# Patient Record
Sex: FEMALE | Race: BLACK OR AFRICAN AMERICAN | Employment: UNEMPLOYED | URBAN - METROPOLITAN AREA
[De-identification: names, ages, dates, MRNs, and addresses within clinical notes are randomized per-mention and may not be internally consistent; named-entity substitution may affect disease eponyms.]

---

## 2017-05-30 ENCOUNTER — OFFICE VISIT (OUTPATIENT)
Dept: FAMILY MEDICINE CLINIC | Age: 27
End: 2017-05-30

## 2017-05-30 VITALS
HEART RATE: 97 BPM | WEIGHT: 134 LBS | TEMPERATURE: 98.2 F | HEIGHT: 64 IN | SYSTOLIC BLOOD PRESSURE: 147 MMHG | RESPIRATION RATE: 20 BRPM | BODY MASS INDEX: 22.88 KG/M2 | DIASTOLIC BLOOD PRESSURE: 93 MMHG | OXYGEN SATURATION: 98 %

## 2017-05-30 DIAGNOSIS — I10 ESSENTIAL HYPERTENSION: Primary | ICD-10-CM

## 2017-05-30 DIAGNOSIS — F32.A DEPRESSION, UNSPECIFIED DEPRESSION TYPE: ICD-10-CM

## 2017-05-30 RX ORDER — BUPROPION HYDROCHLORIDE 150 MG/1
150 TABLET, EXTENDED RELEASE ORAL 2 TIMES DAILY
Qty: 60 TAB | Refills: 3 | Status: SHIPPED | OUTPATIENT
Start: 2017-05-30 | End: 2017-08-22 | Stop reason: SDUPTHER

## 2017-05-30 RX ORDER — AMLODIPINE BESYLATE 5 MG/1
5 TABLET ORAL DAILY
Qty: 30 TAB | Refills: 1 | Status: SHIPPED | OUTPATIENT
Start: 2017-05-30 | End: 2017-08-22 | Stop reason: SDUPTHER

## 2017-05-30 RX ORDER — DULOXETIN HYDROCHLORIDE 60 MG/1
CAPSULE, DELAYED RELEASE ORAL
Qty: 30 CAP | Refills: 3 | Status: SHIPPED | OUTPATIENT
Start: 2017-05-30 | End: 2017-08-22 | Stop reason: SDUPTHER

## 2017-05-30 NOTE — PROGRESS NOTES
1. Have you been to the ER, urgent care clinic since your last visit? Hospitalized since your last visit? Yes    2. Have you seen or consulted any other health care providers outside of the [facility] since your last visit? Include any pap smears or colon screening.  No

## 2017-05-30 NOTE — PROGRESS NOTES
HISTORY OF PRESENT ILLNESS  Molina Yin is a 24 y.o. female. HPI  Pt was recently in the hospital for 2 days, discharged on 5-19, she was in for DKA, her insulin pump failed, it was fixed later and she is followed by her Endocrinologist    HTN, not controlled, her bp has been elevated, and it was also elevated during her hospital stay, she is on lisinopril daily    Depression, stable on cymbalta and wellbutrin, need refills on meds    Review of Systems   Constitutional: Negative for chills and fever. Respiratory: Negative for cough and shortness of breath. Cardiovascular: Negative for chest pain and leg swelling. Gastrointestinal: Negative for abdominal pain and nausea. Neurological: Positive for headaches. Negative for dizziness. Psychiatric/Behavioral: The patient does not have insomnia. Physical Exam   Constitutional: She is oriented to person, place, and time. She appears well-developed and well-nourished. Neck: Neck supple. No thyromegaly present. Cardiovascular: Normal rate, regular rhythm and normal heart sounds. No murmur heard. Pulmonary/Chest: Effort normal and breath sounds normal. She has no wheezes. She has no rales. Abdominal: Soft. Bowel sounds are normal. There is no tenderness. Musculoskeletal: She exhibits no edema. Lymphadenopathy:     She has no cervical adenopathy. Neurological: She is alert and oriented to person, place, and time. Psychiatric: She has a normal mood and affect. Her behavior is normal. Judgment and thought content normal.   Vitals reviewed. ASSESSMENT and PLAN  Flavia Leon was seen today for hypertension. Diagnoses and all orders for this visit:    Essential hypertension, uncontrolled, add Norvasc  -     amLODIPine (NORVASC) 5 mg tablet; Take 1 Tab by mouth daily. Depression, unspecified depression type, stable  -     DULoxetine (CYMBALTA) 60 mg capsule;  Take 1 capsule daily  -     buPROPion SR (WELLBUTRIN SR) 150 mg SR tablet; Take 1 Tab by mouth two (2) times a day.     Headache, ? 2/2 elevated bp, she may take Tylenol prn for now, monitor bp    rtc 1 mo

## 2017-06-15 DIAGNOSIS — F32.A DEPRESSION, UNSPECIFIED DEPRESSION TYPE: ICD-10-CM

## 2017-06-16 RX ORDER — DULOXETIN HYDROCHLORIDE 60 MG/1
CAPSULE, DELAYED RELEASE ORAL
Qty: 30 CAP | Refills: 0 | Status: SHIPPED | OUTPATIENT
Start: 2017-06-16 | End: 2018-03-02 | Stop reason: SDUPTHER

## 2017-08-22 ENCOUNTER — OFFICE VISIT (OUTPATIENT)
Dept: FAMILY MEDICINE CLINIC | Age: 27
End: 2017-08-22

## 2017-08-22 VITALS
WEIGHT: 139 LBS | BODY MASS INDEX: 23.73 KG/M2 | HEIGHT: 64 IN | HEART RATE: 100 BPM | DIASTOLIC BLOOD PRESSURE: 80 MMHG | SYSTOLIC BLOOD PRESSURE: 129 MMHG | TEMPERATURE: 98.4 F | OXYGEN SATURATION: 100 % | RESPIRATION RATE: 20 BRPM

## 2017-08-22 DIAGNOSIS — I10 ESSENTIAL HYPERTENSION: ICD-10-CM

## 2017-08-22 DIAGNOSIS — F32.A DEPRESSION, UNSPECIFIED DEPRESSION TYPE: ICD-10-CM

## 2017-08-22 RX ORDER — AMLODIPINE BESYLATE 5 MG/1
5 TABLET ORAL DAILY
Qty: 30 TAB | Refills: 3 | Status: SHIPPED | OUTPATIENT
Start: 2017-08-22 | End: 2018-03-02 | Stop reason: SDUPTHER

## 2017-08-22 RX ORDER — DULOXETIN HYDROCHLORIDE 60 MG/1
CAPSULE, DELAYED RELEASE ORAL
Qty: 30 CAP | Refills: 3 | Status: SHIPPED | OUTPATIENT
Start: 2017-08-22 | End: 2018-02-23 | Stop reason: SDUPTHER

## 2017-08-22 RX ORDER — BUPROPION HYDROCHLORIDE 150 MG/1
150 TABLET, EXTENDED RELEASE ORAL 2 TIMES DAILY
Qty: 60 TAB | Refills: 3 | Status: SHIPPED | OUTPATIENT
Start: 2017-08-22 | End: 2018-03-02 | Stop reason: SDUPTHER

## 2017-08-22 RX ORDER — LISINOPRIL 20 MG/1
20 TABLET ORAL DAILY
Qty: 30 TAB | Refills: 3 | Status: SHIPPED | OUTPATIENT
Start: 2017-08-22 | End: 2018-03-02 | Stop reason: SDUPTHER

## 2017-08-22 NOTE — PROGRESS NOTES
Tiarra Arnold is a 24 y.o. female  1. Have you been to the ER, urgent care clinic since your last visit? Hospitalized since your last visit? No    2. Have you seen or consulted any other health care providers outside of the [address] [place] [address] since your last visit? Include any pap smears or colon screening.  No

## 2017-08-22 NOTE — PROGRESS NOTES
HISTORY OF PRESENT ILLNESS  Dinesh Dodson is a 24 y.o. female. HPI  HTN, improved, bp is much better since last visit, on meds daily  Depression, not well controlled, she still feels down at times, she is taking her meds daily  Review of Systems   Respiratory: Negative for shortness of breath. Cardiovascular: Negative for chest pain and palpitations. Psychiatric/Behavioral: Negative for substance abuse. The patient does not have insomnia. Physical Exam   Constitutional: She appears well-developed and well-nourished. Cardiovascular: Normal rate, regular rhythm and normal heart sounds. Pulmonary/Chest: Effort normal and breath sounds normal.   Psychiatric: She has a normal mood and affect. Her behavior is normal.   Vitals reviewed. ASSESSMENT and PLAN  Diagnoses and all orders for this visit:    1. Essential hypertension, stable, continue meds  -     amLODIPine (NORVASC) 5 mg tablet; Take 1 Tab by mouth daily. -     lisinopril (PRINIVIL, ZESTRIL) 20 mg tablet; Take 1 Tab by mouth daily. 2. Depression, unspecified depression type, continue meds, refer to Psych  -     buPROPion SR (WELLBUTRIN SR) 150 mg SR tablet; Take 1 Tab by mouth two (2) times a day. -     DULoxetine (CYMBALTA) 60 mg capsule;  Take 1 capsule daily  -     REFERRAL TO PSYCHIATRY    rtc 4 mos

## 2018-02-23 ENCOUNTER — OFFICE VISIT (OUTPATIENT)
Dept: FAMILY MEDICINE CLINIC | Age: 28
End: 2018-02-23

## 2018-02-23 VITALS
HEART RATE: 104 BPM | OXYGEN SATURATION: 100 % | HEIGHT: 64 IN | WEIGHT: 130 LBS | DIASTOLIC BLOOD PRESSURE: 85 MMHG | SYSTOLIC BLOOD PRESSURE: 130 MMHG | TEMPERATURE: 96.9 F | BODY MASS INDEX: 22.2 KG/M2 | RESPIRATION RATE: 20 BRPM

## 2018-02-23 DIAGNOSIS — M54.50 ACUTE LEFT-SIDED LOW BACK PAIN WITHOUT SCIATICA: Primary | ICD-10-CM

## 2018-02-23 RX ORDER — CYCLOBENZAPRINE HCL 5 MG
5 TABLET ORAL
Qty: 30 TAB | Refills: 0 | Status: SHIPPED | OUTPATIENT
Start: 2018-02-23 | End: 2018-03-02

## 2018-02-23 RX ORDER — KETOROLAC TROMETHAMINE 30 MG/ML
30 INJECTION, SOLUTION INTRAMUSCULAR; INTRAVENOUS ONCE
Qty: 1 VIAL | Refills: 0
Start: 2018-02-23 | End: 2018-02-23

## 2018-02-23 RX ORDER — DICLOFENAC SODIUM 75 MG/1
75 TABLET, DELAYED RELEASE ORAL 2 TIMES DAILY
COMMUNITY
Start: 2018-02-19 | End: 2018-02-23 | Stop reason: SDUPTHER

## 2018-02-23 RX ORDER — DICLOFENAC SODIUM 75 MG/1
75 TABLET, DELAYED RELEASE ORAL 2 TIMES DAILY
Qty: 20 TAB | Refills: 0 | Status: SHIPPED | OUTPATIENT
Start: 2018-02-23 | End: 2018-03-02

## 2018-02-23 RX ORDER — CYCLOBENZAPRINE HCL 10 MG
10 TABLET ORAL 2 TIMES DAILY
COMMUNITY
Start: 2018-02-19 | End: 2018-02-23 | Stop reason: DRUGHIGH

## 2018-02-23 NOTE — PROGRESS NOTES
HISTORY OF PRESENT ILLNESS  Dameon Clarke is a 34 y.o. female. HPI  C/o left side low back pain, started 5 days ago suddenly after she \"twisted my back\", no radiation to the legs, no numbness or weakness, went to the ER, given Voltaren and flexeril but she took them few times only, her pain is slightly better, she was not given any exercises to do at home  Review of Systems   Constitutional: Negative for chills and fever. Gastrointestinal: Negative for abdominal pain and nausea. Genitourinary: Negative for dysuria and hematuria. Physical Exam   Cardiovascular: Normal rate, regular rhythm and normal heart sounds. Pulmonary/Chest: Effort normal and breath sounds normal.   Musculoskeletal:        Lumbar back: She exhibits decreased range of motion, tenderness (left lumbar), pain and spasm. She exhibits no bony tenderness, no edema and no deformity. Neurological: She has normal strength. No sensory deficit. Skin: No rash noted. Vitals reviewed. ASSESSMENT and PLAN  Diagnoses and all orders for this visit:    1. Acute left-sided low back pain without sciatica  -     ketorolac (TORADOL) 30 mg/mL (1 mL) injection; 1 mL by IntraVENous route once for 1 dose. -     KETOROLAC TROMETHAMINE INJ  -     MA THER/PROPH/DIAG INJECTION, SUBCUT/IM  -     cyclobenzaprine (FLEXERIL) 5 mg tablet; Take 1 Tab by mouth three (3) times daily as needed for Muscle Spasm(s). -     diclofenac EC (VOLTAREN) 75 mg EC tablet; Take 1 Tab by mouth two (2) times a day.     Home exercises given  Off work for 2 days, light duties with no heavy lifting for a week

## 2018-02-23 NOTE — LETTER
NOTIFICATION RETURN TO WORK  
 
2/23/2018 Ms. Jericho Goldberg [address] [place] [address] To Whom It May Concern: 
 
Jericho Goldberg is currently under the care of [address] [place] [address]. She will return to work on 2-, but she should not do any lifting more than 20 lbs for a week, may go back to full duties on 3-3-18. If there are questions or concerns please have the patient contact our office. Sincerely, Ahsan Verma MD

## 2018-02-23 NOTE — PROGRESS NOTES
Chata Herrera is a 34 y.o. female (: 1990) presenting to address:    Chief Complaint   Patient presents with    Back Pain       Vitals:    18 0811   BP: 130/85   Pulse: (!) 104   Resp: 20   Temp: 96.9 °F (36.1 °C)   TempSrc: Oral   SpO2: 100%   Weight: 130 lb (59 kg)   Height: 5' 4\" (1.626 m)   PainSc:   8   PainLoc: Back   LMP: 2018       Learning Assessment:     Learning Assessment 3/16/2016   PRIMARY LEARNER Patient   HIGHEST LEVEL OF EDUCATION - PRIMARY LEARNER  SOME COLLEGE   BARRIERS PRIMARY LEARNER NONE   CO-LEARNER CAREGIVER No   PRIMARY LANGUAGE ENGLISH   LEARNER PREFERENCE PRIMARY OTHER (COMMENT)   ANSWERED BY patient   RELATIONSHIP SELF     Depression Screening:     PHQ over the last two weeks 2017   Little interest or pleasure in doing things Not at all   Feeling down, depressed or hopeless Not at all   Total Score PHQ 2 0   Trouble falling or staying asleep, or sleeping too much -   Feeling tired or having little energy -   Poor appetite or overeating -   Feeling bad about yourself - or that you are a failure or have let yourself or your family down -   Trouble concentrating on things such as school, work, reading or watching TV -   Moving or speaking so slowly that other people could have noticed; or the opposite being so fidgety that others notice -   Thoughts of being better off dead, or hurting yourself in some way -   PHQ 9 Score -   How difficult have these problems made it for you to do your work, take care of your home and get along with others -     Fall Risk Assessment:     Fall Risk Assessment, last 12 mths 2018   Able to walk? Yes   Fall in past 12 months? No     Abuse Screening:     Abuse Screening Questionnaire 2018   Do you ever feel afraid of your partner? N   Are you in a relationship with someone who physically or mentally threatens you? N   Is it safe for you to go home? Y     Coordination of Care Questionaire:   1.  Have you been to the ER, urgent care clinic since your last visit? Hospitalized since your last visit? YES [facility]- ED 2/19/18    2. Have you seen or consulted any other health care providers outside of the [address] [place] [address] since your last visit? Include any pap smears or colon screening. NO    Advanced Directive:   1. Do you have an Advanced Directive? YES    2. Would you like information on Advanced Directives?  NO

## 2018-02-23 NOTE — PATIENT INSTRUCTIONS
Learning About How to Have a Healthy Back  What causes back pain? Back pain is often caused by overuse, strain, or injury. For example, people often hurt their backs playing sports or working in the yard, being jolted in a car accident, or lifting something too heavy. Aging plays a part too. Your bones and muscles tend to lose strength as you age, which makes injury more likely. The spongy discs between the bones of the spine (vertebrae) may suffer from wear and tear and no longer provide enough cushion between the bones. A disc that bulges or breaks open (herniated disc) can press on nerves, causing back pain. In some people, back pain is the result of arthritis, broken vertebrae caused by bone loss (osteoporosis), illness, or a spine problem. Although most people have back pain at one time or another, there are steps you can take to make it less likely. How can you have a healthy back? Reduce stress on your back through good posture  Slumping or slouching alone may not cause low back pain. But after the back has been strained or injured, bad posture can make pain worse. · Sleep in a position that maintains your back's normal curves and on a mattress that feels comfortable. Sleep on your side with a pillow between your knees, or sleep on your back with a pillow under your knees. These positions can reduce strain on your back. · Stand and sit up straight. \"Good posture\" generally means your ears, shoulders, and hips are in a straight line. · If you must stand for a long time, put one foot on a stool, ledge, or box. Switch feet every now and then. · Sit in a chair that is low enough to let you place both feet flat on the floor with both knees nearly level with your hips. If your chair or desk is too high, use a footrest to raise your knees. Place a small pillow, a rolled-up towel, or a lumbar roll in the curve of your back if you need extra support.   · Try a kneeling chair, which helps tilt your hips forward. This takes pressure off your lower back. · Try sitting on an exercise ball. It can rock from side to side, which helps keep your back loose. · When driving, keep your knees nearly level with your hips. Sit straight, and drive with both hands on the steering wheel. Your arms should be in a slightly bent position. Reduce stress on your back through careful lifting  · Squat down, bending at the hips and knees only. If you need to, put one knee to the floor and extend your other knee in front of you, bent at a right angle (half kneeling). · Press your chest straight forward. This helps keep your upper back straight while keeping a slight arch in your low back. · Hold the load as close to your body as possible, at the level of your belly button (navel). · Use your feet to change direction, taking small steps. · Lead with your hips as you change direction. Keep your shoulders in line with your hips as you move. · Set down your load carefully, squatting with your knees and hips only. Exercise and stretch your back  · Do some exercise on most days of the week, if your doctor says it is okay. You can walk, run, swim, or cycle. · Stretch your back muscles. Here are a few exercises to try:  Michelle Corti on your back, and gently pull one bent knee to your chest. Put that foot back on the floor, and then pull the other knee to your chest.  ¨ Do pelvic tilts. Lie on your back with your knees bent. Tighten your stomach muscles. Pull your belly button (navel) in and up toward your ribs. You should feel like your back is pressing to the floor and your hips and pelvis are slightly lifting off the floor. Hold for 6 seconds while breathing smoothly. ¨ Sit with your back flat against a wall. · Keep your core muscles strong. The muscles of your back, belly (abdomen), and buttocks support your spine. ¨ Pull in your belly and imagine pulling your navel toward your spine. Hold this for 6 seconds, then relax.  Remember to keep breathing normally as you tense your muscles. ¨ Do curl-ups. Always do them with your knees bent. Keep your low back on the floor, and curl your shoulders toward your knees using a smooth, slow motion. Keep your arms folded across your chest. If this bothers your neck, try putting your hands behind your neck (not your head), with your elbows spread apart. ¨ Lie on your back with your knees bent and your feet flat on the floor. Tighten your belly muscles, and then push with your feet and raise your buttocks up a few inches. Hold this position 6 seconds as you continue to breathe normally, then lower yourself slowly to the floor. Repeat 8 to 12 times. ¨ If you like group exercise, try Pilates or yoga. These classes have poses that strengthen the core muscles. Lead a healthy lifestyle  · Stay at a healthy weight to avoid strain on your back. · Do not smoke. Smoking increases the risk of osteoporosis, which weakens the spine. If you need help quitting, talk to your doctor about stop-smoking programs and medicines. These can increase your chances of quitting for good. Where can you learn more? Go to http://gailmetraTecmichelle.info/. Enter L315 in the search box to learn more about \"Learning About How to Have a Healthy Back. \"  Current as of: March 21, 2017  Content Version: 11.4  © 8033-2579 Healthwise, Incorporated. Care instructions adapted under license by [employer] (which disclaims liability or warranty for this information). If you have questions about a medical condition or this instruction, always ask your healthcare professional. [place] [address] any warranty or liability for your use of this information. Back Pain: Care Instructions  Your Care Instructions    Back pain has many possible causes. It is often related to problems with muscles and ligaments of the back. It may also be related to problems with the nerves, discs, or bones of the back.  Moving, lifting, standing, sitting, or sleeping in an awkward way can strain the back. Sometimes you don't notice the injury until later. Arthritis is another common cause of back pain. Although it may hurt a lot, back pain usually improves on its own within several weeks. Most people recover in 12 weeks or less. Using good home treatment and being careful not to stress your back can help you feel better sooner. Follow-up care is a key part of your treatment and safety. Be sure to make and go to all appointments, and call your doctor if you are having problems. It's also a good idea to know your test results and keep a list of the medicines you take. How can you care for yourself at home? · Sit or lie in positions that are most comfortable and reduce your pain. Try one of these positions when you lie down:  ¨ Lie on your back with your knees bent and supported by large pillows. ¨ Lie on the floor with your legs on the seat of a sofa or chair. Grant Valeri on your side with your knees and hips bent and a pillow between your legs. ¨ Lie on your stomach if it does not make pain worse. · Do not sit up in bed, and avoid soft couches and twisted positions. Bed rest can help relieve pain at first, but it delays healing. Avoid bed rest after the first day of back pain. · Change positions every 30 minutes. If you must sit for long periods of time, take breaks from sitting. Get up and walk around, or lie in a comfortable position. · Try using a heating pad on a low or medium setting for 15 to 20 minutes every 2 or 3 hours. Try a warm shower in place of one session with the heating pad. · You can also try an ice pack for 10 to 15 minutes every 2 to 3 hours. Put a thin cloth between the ice pack and your skin. · Take pain medicines exactly as directed. ¨ If the doctor gave you a prescription medicine for pain, take it as prescribed.   ¨ If you are not taking a prescription pain medicine, ask your doctor if you can take an over-the-counter medicine. · Take short walks several times a day. You can start with 5 to 10 minutes, 3 or 4 times a day, and work up to longer walks. Walk on level surfaces and avoid hills and stairs until your back is better. · Return to work and other activities as soon as you can. Continued rest without activity is usually not good for your back. · To prevent future back pain, do exercises to stretch and strengthen your back and stomach. Learn how to use good posture, safe lifting techniques, and proper body mechanics. When should you call for help? Call your doctor now or seek immediate medical care if:  ? · You have new or worsening numbness in your legs. ? · You have new or worsening weakness in your legs. (This could make it hard to stand up.)   ? · You lose control of your bladder or bowels. ? Watch closely for changes in your health, and be sure to contact your doctor if:  ? · Your pain gets worse. ? · You are not getting better after 2 weeks. Where can you learn more? Go to http://gail-michelle.info/. Enter G614 in the search box to learn more about \"Back Pain: Care Instructions. \"  Current as of: March 21, 2017  Content Version: 11.4  © 4999-6600 [employer]. Care instructions adapted under license by [employer] (which disclaims liability or warranty for this information). If you have questions about a medical condition or this instruction, always ask your healthcare professional. [place] [address] any warranty or liability for your use of this information. Low Back Pain: Exercises  Your Care Instructions  Here are some examples of typical rehabilitation exercises for your condition. Start each exercise slowly. Ease off the exercise if you start to have pain. Your doctor or physical therapist will tell you when you can start these exercises and which ones will work best for you. How to do the exercises  Press-up    1.  [employer] on your stomach, supporting your body with your forearms. 2. Press your elbows down into the floor to raise your upper back. As you do this, relax your stomach muscles and allow your back to arch without using your back muscles. As your press up, do not let your hips or pelvis come off the floor. 3. Hold for 15 to 30 seconds, then relax. 4. Repeat 2 to 4 times. Alternate arm and leg (bird dog) exercise    Do this exercise slowly. Try to keep your body straight at all times, and do not let one hip drop lower than the other. 1. Start on the floor, on your hands and knees. 2. Tighten your belly muscles. 3. Raise one leg off the floor, and hold it straight out behind you. Be careful not to let your hip drop down, because that will twist your trunk. 4. Hold for about 6 seconds, then lower your leg and switch to the other leg. 5. Repeat 8 to 12 times on each leg. 6. Over time, work up to holding for 10 to 30 seconds each time. 7. If you feel stable and secure with your leg raised, try raising the opposite arm straight out in front of you at the same time. Knee-to-chest exercise    1. Lie on your back with your knees bent and your feet flat on the floor. 2. Bring one knee to your chest, keeping the other foot flat on the floor (or keeping the other leg straight, whichever feels better on your lower back). 3. Keep your lower back pressed to the floor. Hold for at least 15 to 30 seconds. 4. Relax, and lower the knee to the starting position. 5. Repeat with the other leg. Repeat 2 to 4 times with each leg. 6. To get more stretch, put your other leg flat on the floor while pulling your knee to your chest.  Curl-ups    1. Lie on the floor on your back with your knees bent at a 90-degree angle. Your feet should be flat on the floor, about 12 inches from your buttocks.   2. Cross your arms over your chest. If this bothers your neck, try putting your hands behind your neck (not your head), with your elbows spread apart.  3. Slowly tighten your belly muscles and raise your shoulder blades off the floor. 4. Keep your head in line with your body, and do not press your chin to your chest.  5. Hold this position for 1 or 2 seconds, then slowly lower yourself back down to the floor. 6. Repeat 8 to 12 times. Pelvic tilt exercise    1. Lie on your back with your knees bent. 2. \"Brace\" your stomach. This means to tighten your muscles by pulling in and imagining your belly button moving toward your spine. You should feel like your back is pressing to the floor and your hips and pelvis are rocking back. 3. Hold for about 6 seconds while you breathe smoothly. 4. Repeat 8 to 12 times. Heel dig bridging    1. Lie on your back with both knees bent and your ankles bent so that only your heels are digging into the floor. Your knees should be bent about 90 degrees. 2. Then push your heels into the floor, squeeze your buttocks, and lift your hips off the floor until your shoulders, hips, and knees are all in a straight line. 3. Hold for about 6 seconds as you continue to breathe normally, and then slowly lower your hips back down to the floor and rest for up to 10 seconds. 4. Do 8 to 12 repetitions. Hamstring stretch in doorway    1. Lie on your back in a doorway, with one leg through the open door. 2. Slide your leg up the wall to straighten your knee. You should feel a gentle stretch down the back of your leg. 3. Hold the stretch for at least 15 to 30 seconds. Do not arch your back, point your toes, or bend either knee. Keep one heel touching the floor and the other heel touching the wall. 4. Repeat with your other leg. 5. Do 2 to 4 times for each leg. Hip flexor stretch    1. Kneel on the floor with one knee bent and one leg behind you. Place your forward knee over your foot. Keep your other knee touching the floor. 2. Slowly push your hips forward until you feel a stretch in the upper thigh of your rear leg.   3. Hold the stretch for at least 15 to 30 seconds. Repeat with your other leg. 4. Do 2 to 4 times on each side. Wall sit    1. Stand with your back 10 to 12 inches away from a wall. 2. Lean into the wall until your back is flat against it. 3. Slowly slide down until your knees are slightly bent, pressing your lower back into the wall. 4. Hold for about 6 seconds, then slide back up the wall. 5. Repeat 8 to 12 times. Follow-up care is a key part of your treatment and safety. Be sure to make and go to all appointments, and call your doctor if you are having problems. It's also a good idea to know your test results and keep a list of the medicines you take. Where can you learn more? Go to http://gail-michelle.info/. Enter V255 in the search box to learn more about \"Low Back Pain: Exercises. \"  Current as of: March 21, 2017  Content Version: 11.4  © 5249-1869 Healthwise, Incorporated. Care instructions adapted under license by [employer] (which disclaims liability or warranty for this information). If you have questions about a medical condition or this instruction, always ask your healthcare professional. [place] [address] any warranty or liability for your use of this information.

## 2018-02-23 NOTE — MR AVS SNAPSHOT
Georgette Orlando 
 
 
 [address] [address] [facility] [id number] [phone] Patient: Rose Mary Chew MRN: [id number] :1990 Visit Information Date & Time Provider Department Dept. Phone Encounter #  
 2018  8:15 AM Carol Zuniga, [employer] [phone] [id number] Upcoming Health Maintenance Date Due  
 HPV AGE 9Y-34Y (1 of 3 - Female 3 Dose Series) 2003 DTaP/Tdap/Td series (1 - Tdap) 2013 PAP AKA CERVICAL CYTOLOGY 2013 Influenza Age 5 to Adult 2017 Allergies as of 2018  Review Complete On: 2018 By: Carol Zuniga MD  
 No Known Allergies Current Immunizations  Never Reviewed No immunizations on file. Not reviewed this visit You Were Diagnosed With   
  
 Codes Comments Acute left-sided low back pain without sciatica    -  Primary ICD-10-CM: M54.5 ICD-9-CM: 724.2 Vitals BP Pulse Temp Resp Height(growth percentile) Weight(growth percentile) 130/85 (BP 1 Location: Left arm, BP Patient Position: Sitting) (!) 104 96.9 °F (36.1 °C) (Oral) 20 5' 4\" (1.626 m) 130 lb (59 kg) LMP SpO2 BMI OB Status Smoking Status 2018 100% 22.31 kg/m2 Having regular periods Former Smoker Vitals History BMI and BSA Data Body Mass Index Body Surface Area  
 22.31 kg/m 2 1.63 m 2 Preferred Pharmacy Pharmacy Name Phone [facility] [address] [place] [address] [phone] Your Updated Medication List  
  
   
This list is accurate as of 18  8:36 AM.  Always use your most recent med list.  
  
  
  
  
  insulin pump Misc Commonly known as:  PATIENT SUPPLIED  
by SubCUTAneous route as needed. amLODIPine 5 mg tablet Commonly known as:  Simi Peña Take 1 Tab by mouth daily. buPROPion  mg SR tablet Commonly known as:  Nirmal Susan Take 1 Tab by mouth two (2) times a day. cyclobenzaprine 5 mg tablet Commonly known as:  FLEXERIL Take 1 Tab by mouth three (3) times daily as needed for Muscle Spasm(s). diclofenac EC 75 mg EC tablet Commonly known as:  VOLTAREN Take 1 Tab by mouth two (2) times a day. DULoxetine 60 mg capsule Commonly known as:  CYMBALTA TAKE 1 CAPSULE BY MOUTH DAILY  
  
 ketorolac 30 mg/mL (1 mL) injection Commonly known as:  TORADOL  
1 mL by IntraVENous route once for 1 dose. lisinopril 20 mg tablet Commonly known as:  Marlane Bozena Take 1 Tab by mouth daily. Prescriptions Sent to Pharmacy Refills  
 cyclobenzaprine (FLEXERIL) 5 mg tablet 0 Sig: Take 1 Tab by mouth three (3) times daily as needed for Muscle Spasm(s). Class: Normal  
 Pharmacy: [facility] [address] [place] [address], [address] [place] [address] Ph #: [phone] Route: Oral  
 diclofenac EC (VOLTAREN) 75 mg EC tablet 0 Sig: Take 1 Tab by mouth two (2) times a day. Class: Normal  
 Pharmacy: [facility] [address], [address] [place] [address] Ph #: [phone] Route: Oral  
  
We Performed the Following KETOROLAC TROMETHAMINE INJ [ [facility]] NE THER/PROPH/DIAG INJECTION, SUBCUT/IM [id number] CPT(R)] Patient Instructions Learning About How to Have a Healthy Back What causes back pain? Back pain is often caused by overuse, strain, or injury. For example, people often hurt their backs playing sports or working in the yard, being jolted in a car accident, or lifting something too heavy. Aging plays a part too. Your bones and muscles tend to lose strength as you age, which makes injury more likely.  The spongy discs between the bones of the spine (vertebrae) may suffer from wear and tear and no longer provide enough cushion between the bones. A disc that bulges or breaks open (herniated disc) can press on nerves, causing back pain. In some people, back pain is the result of arthritis, broken vertebrae caused by bone loss (osteoporosis), illness, or a spine problem. Although most people have back pain at one time or another, there are steps you can take to make it less likely. How can you have a healthy back? Reduce stress on your back through good posture Slumping or slouching alone may not cause low back pain. But after the back has been strained or injured, bad posture can make pain worse. · Sleep in a position that maintains your back's normal curves and on a mattress that feels comfortable. Sleep on your side with a pillow between your knees, or sleep on your back with a pillow under your knees. These positions can reduce strain on your back. · Stand and sit up straight. \"Good posture\" generally means your ears, shoulders, and hips are in a straight line. · If you must stand for a long time, put one foot on a stool, ledge, or box. Switch feet every now and then. · Sit in a chair that is low enough to let you place both feet flat on the floor with both knees nearly level with your hips. If your chair or desk is too high, use a footrest to raise your knees. Place a small pillow, a rolled-up towel, or a lumbar roll in the curve of your back if you need extra support. · Try a kneeling chair, which helps tilt your hips forward. This takes pressure off your lower back. · Try sitting on an exercise ball. It can rock from side to side, which helps keep your back loose. · When driving, keep your knees nearly level with your hips. Sit straight, and drive with both hands on the steering wheel. Your arms should be in a slightly bent position. Reduce stress on your back through careful lifting · Squat down, bending at the hips and knees only.  If you need to, put one knee to the floor and extend your other knee in front of you, bent at a right angle (half kneeling). · Press your chest straight forward. This helps keep your upper back straight while keeping a slight arch in your low back. · Hold the load as close to your body as possible, at the level of your belly button (navel). · Use your feet to change direction, taking small steps. · Lead with your hips as you change direction. Keep your shoulders in line with your hips as you move. · Set down your load carefully, squatting with your knees and hips only. Exercise and stretch your back · Do some exercise on most days of the week, if your doctor says it is okay. You can walk, run, swim, or cycle. · Stretch your back muscles. Here are a few exercises to try: ¨ Lie on your back, and gently pull one bent knee to your chest. Put that foot back on the floor, and then pull the other knee to your chest. 
¨ Do pelvic tilts. Lie on your back with your knees bent. Tighten your stomach muscles. Pull your belly button (navel) in and up toward your ribs. You should feel like your back is pressing to the floor and your hips and pelvis are slightly lifting off the floor. Hold for 6 seconds while breathing smoothly. ¨ Sit with your back flat against a wall. · Keep your core muscles strong. The muscles of your back, belly (abdomen), and buttocks support your spine. ¨ Pull in your belly and imagine pulling your navel toward your spine. Hold this for 6 seconds, then relax. Remember to keep breathing normally as you tense your muscles. ¨ Do curl-ups. Always do them with your knees bent. Keep your low back on the floor, and curl your shoulders toward your knees using a smooth, slow motion. Keep your arms folded across your chest. If this bothers your neck, try putting your hands behind your neck (not your head), with your elbows spread apart. ¨ Lie on your back with your knees bent and your feet flat on the floor. Tighten your belly muscles, and then push with your feet and raise your buttocks up a few inches. Hold this position 6 seconds as you continue to breathe normally, then lower yourself slowly to the floor. Repeat 8 to 12 times. ¨ If you like group exercise, try Pilates or yoga. These classes have poses that strengthen the core muscles. Lead a healthy lifestyle · Stay at a healthy weight to avoid strain on your back. · Do not smoke. Smoking increases the risk of osteoporosis, which weakens the spine. If you need help quitting, talk to your doctor about stop-smoking programs and medicines. These can increase your chances of quitting for good. Where can you learn more? Go to http://gailStopandWalk.commichelle.info/. Enter L315 in the search box to learn more about \"Learning About How to Have a Healthy Back. \" Current as of: March 21, 2017 Content Version: 11.4 © 7560-1372 [email or web address]. Care instructions adapted under license by [employer] (which disclaims liability or warranty for this information). If you have questions about a medical condition or this instruction, always ask your healthcare professional. [place] [address] any warranty or liability for your use of this information. Back Pain: Care Instructions Your Care Instructions Back pain has many possible causes. It is often related to problems with muscles and ligaments of the back. It may also be related to problems with the nerves, discs, or bones of the back. Moving, lifting, standing, sitting, or sleeping in an awkward way can strain the back. Sometimes you don't notice the injury until later. Arthritis is another common cause of back pain. Although it may hurt a lot, back pain usually improves on its own within several weeks. Most people recover in 12 weeks or less. Using good home treatment and being careful not to stress your back can help you feel better sooner. Follow-up care is a key part of your treatment and safety. Be sure to make and go to all appointments, and call your doctor if you are having problems. It's also a good idea to know your test results and keep a list of the medicines you take. How can you care for yourself at home? · Sit or lie in positions that are most comfortable and reduce your pain. Try one of these positions when you lie down: ¨ Lie on your back with your knees bent and supported by large pillows. ¨ Lie on the floor with your legs on the seat of a sofa or chair. Aung Harries on your side with your knees and hips bent and a pillow between your legs. ¨ Lie on your stomach if it does not make pain worse. · Do not sit up in bed, and avoid soft couches and twisted positions. Bed rest can help relieve pain at first, but it delays healing. Avoid bed rest after the first day of back pain. · Change positions every 30 minutes. If you must sit for long periods of time, take breaks from sitting. Get up and walk around, or lie in a comfortable position. · Try using a heating pad on a low or medium setting for 15 to 20 minutes every 2 or 3 hours. Try a warm shower in place of one session with the heating pad. · You can also try an ice pack for 10 to 15 minutes every 2 to 3 hours. Put a thin cloth between the ice pack and your skin. · Take pain medicines exactly as directed. ¨ If the doctor gave you a prescription medicine for pain, take it as prescribed. ¨ If you are not taking a prescription pain medicine, ask your doctor if you can take an over-the-counter medicine. · Take short walks several times a day. You can start with 5 to 10 minutes, 3 or 4 times a day, and work up to longer walks. Walk on level surfaces and avoid hills and stairs until your back is better. · Return to work and other activities as soon as you can. Continued rest without activity is usually not good for your back. · To prevent future back pain, do exercises to stretch and strengthen your back and stomach. Learn how to use good posture, safe lifting techniques, and proper body mechanics. When should you call for help? Call your doctor now or seek immediate medical care if: 
? · You have new or worsening numbness in your legs. ? · You have new or worsening weakness in your legs. (This could make it hard to stand up.) ? · You lose control of your bladder or bowels. ? Watch closely for changes in your health, and be sure to contact your doctor if: 
? · Your pain gets worse. ? · You are not getting better after 2 weeks. Where can you learn more? Go to http://gailWit studiomichelle.info/. Enter Y554 in the search box to learn more about \"Back Pain: Care Instructions. \" Current as of: March 21, 2017 Content Version: 11.4 © 6894-6769 [employer]. Care instructions adapted under license by [employer] (which disclaims liability or warranty for this information). If you have questions about a medical condition or this instruction, always ask your healthcare professional. [address] any warranty or liability for your use of this information. Low Back Pain: Exercises Your Care Instructions Here are some examples of typical rehabilitation exercises for your condition. Start each exercise slowly. Ease off the exercise if you start to have pain. Your doctor or physical therapist will tell you when you can start these exercises and which ones will work best for you. How to do the exercises Press-up 1. Lie on your stomach, supporting your body with your forearms. 2. Press your elbows down into the floor to raise your upper back. As you do this, relax your stomach muscles and allow your back to arch without using your back muscles. As your press up, do not let your hips or pelvis come off the floor. 3. Hold for 15 to 30 seconds, then relax. 4. Repeat 2 to 4 times. Alternate arm and leg (bird dog) exercise Do this exercise slowly. Try to keep your body straight at all times, and do not let one hip drop lower than the other. 1. Start on the floor, on your hands and knees. 2. Tighten your belly muscles. 3. Raise one leg off the floor, and hold it straight out behind you. Be careful not to let your hip drop down, because that will twist your trunk. 4. Hold for about 6 seconds, then lower your leg and switch to the other leg. 5. Repeat 8 to 12 times on each leg. 6. Over time, work up to holding for 10 to 30 seconds each time. 7. If you feel stable and secure with your leg raised, try raising the opposite arm straight out in front of you at the same time. Knee-to-chest exercise 1. Lie on your back with your knees bent and your feet flat on the floor. 2. Bring one knee to your chest, keeping the other foot flat on the floor (or keeping the other leg straight, whichever feels better on your lower back). 3. Keep your lower back pressed to the floor. Hold for at least 15 to 30 seconds. 4. Relax, and lower the knee to the starting position. 5. Repeat with the other leg. Repeat 2 to 4 times with each leg. 6. To get more stretch, put your other leg flat on the floor while pulling your knee to your chest. 
Curl-ups 1. Lie on the floor on your back with your knees bent at a 90-degree angle. Your feet should be flat on the floor, about 12 inches from your buttocks. 2. Cross your arms over your chest. If this bothers your neck, try putting your hands behind your neck (not your head), with your elbows spread apart. 3. Slowly tighten your belly muscles and raise your shoulder blades off the floor. 4. Keep your head in line with your body, and do not press your chin to your chest. 
5. Hold this position for 1 or 2 seconds, then slowly lower yourself back down to the floor. 6. Repeat 8 to 12 times. Pelvic tilt exercise 1. Lie on your back with your knees bent. 2. \"Brace\" your stomach. This means to tighten your muscles by pulling in and imagining your belly button moving toward your spine. You should feel like your back is pressing to the floor and your hips and pelvis are rocking back. 3. Hold for about 6 seconds while you breathe smoothly. 4. Repeat 8 to 12 times. Heel dig bridging 1. Lie on your back with both knees bent and your ankles bent so that only your heels are digging into the floor. Your knees should be bent about 90 degrees. 2. Then push your heels into the floor, squeeze your buttocks, and lift your hips off the floor until your shoulders, hips, and knees are all in a straight line. 3. Hold for about 6 seconds as you continue to breathe normally, and then slowly lower your hips back down to the floor and rest for up to 10 seconds. 4. Do 8 to 12 repetitions. Hamstring stretch in doorway 1. Lie on your back in a doorway, with one leg through the open door. 2. Slide your leg up the wall to straighten your knee. You should feel a gentle stretch down the back of your leg. 3. Hold the stretch for at least 15 to 30 seconds. Do not arch your back, point your toes, or bend either knee. Keep one heel touching the floor and the other heel touching the wall. 4. Repeat with your other leg. 5. Do 2 to 4 times for each leg. Hip flexor stretch 1. Kneel on the floor with one knee bent and one leg behind you. Place your forward knee over your foot. Keep your other knee touching the floor. 2. Slowly push your hips forward until you feel a stretch in the upper thigh of your rear leg. 3. Hold the stretch for at least 15 to 30 seconds. Repeat with your other leg. 4. Do 2 to 4 times on each side. Wall sit 1. Stand with your back 10 to 12 inches away from a wall. 2. Lean into the wall until your back is flat against it.  
3. Slowly slide down until your knees are slightly bent, pressing your lower back into the wall. 4. Hold for about 6 seconds, then slide back up the wall. 5. Repeat 8 to 12 times. Follow-up care is a key part of your treatment and safety. Be sure to make and go to all appointments, and call your doctor if you are having problems. It's also a good idea to know your test results and keep a list of the medicines you take. Where can you learn more? Go to http://gail-michelle.info/. Enter C103 in the search box to learn more about \"Low Back Pain: Exercises. \" Current as of: March 21, 2017 Content Version: 11.4 © 7247-8660 [employer]Think. Care instructions adapted under license by [employer] (which disclaims liability or warranty for this information). If you have questions about a medical condition or this instruction, always ask your healthcare professional. [address] any warranty or liability for your use of this information. Introducing \[facility]\"" & HEALTH SERVICES! Sushma Dunbar introduces Cognii patient portal. Now you can access parts of your medical record, email your doctor's office, and request medication refills online. 1. In your internet browser, go to [email or web address] 2. Click on the First Time User? Click Here link in the Sign In box. You will see the New Member Sign Up page. 3. Enter your Cognii Access Code exactly as it appears below. You will not need to use this code after youve completed the sign-up process. If you do not sign up before the expiration date, you must request a new code. · Cognii Access Code: [email or web address] Expires: 5/24/2018  8:36 AM 
 
4. Enter the last four digits of your Social Security Number (xxxx) and Date of Birth (mm/dd/yyyy) as indicated and click Submit. You will be taken to the next sign-up page. 5. Create a Cognii ID. This will be your Cognii login ID and cannot be changed, so think of one that is secure and easy to remember. 6. Create a Unpakt password. You can change your password at any time. 7. Enter your Password Reset Question and Answer. This can be used at a later time if you forget your password. 8. Enter your e-mail address. You will receive e-mail notification when new information is available in [address] 9. Click Sign Up. You can now view and download portions of your medical record. 10. Click the Download Summary menu link to download a portable copy of your medical information. If you have questions, please visit the Frequently Asked Questions section of the Unpakt website. Remember, Unpakt is NOT to be used for urgent needs. For medical emergencies, dial 911. Now available from your iPhone and Android! Please provide this summary of care documentation to your next provider. Your primary care clinician is listed as France Crawford. If you have any questions after today's visit, please call [phone].

## 2018-03-02 ENCOUNTER — OFFICE VISIT (OUTPATIENT)
Dept: FAMILY MEDICINE CLINIC | Age: 28
End: 2018-03-02

## 2018-03-02 VITALS
DIASTOLIC BLOOD PRESSURE: 90 MMHG | SYSTOLIC BLOOD PRESSURE: 154 MMHG | HEART RATE: 109 BPM | WEIGHT: 130.4 LBS | BODY MASS INDEX: 22.26 KG/M2 | HEIGHT: 64 IN | RESPIRATION RATE: 18 BRPM | TEMPERATURE: 97.8 F | OXYGEN SATURATION: 100 %

## 2018-03-02 DIAGNOSIS — M54.50 ACUTE LEFT-SIDED LOW BACK PAIN WITHOUT SCIATICA: ICD-10-CM

## 2018-03-02 DIAGNOSIS — I10 ESSENTIAL HYPERTENSION: Primary | ICD-10-CM

## 2018-03-02 DIAGNOSIS — F32.A DEPRESSION, UNSPECIFIED DEPRESSION TYPE: ICD-10-CM

## 2018-03-02 RX ORDER — DULOXETIN HYDROCHLORIDE 60 MG/1
CAPSULE, DELAYED RELEASE ORAL
Qty: 30 CAP | Refills: 0 | Status: SHIPPED | OUTPATIENT
Start: 2018-03-02 | End: 2019-07-11 | Stop reason: ALTCHOICE

## 2018-03-02 RX ORDER — BUPROPION HYDROCHLORIDE 150 MG/1
150 TABLET, EXTENDED RELEASE ORAL 2 TIMES DAILY
Qty: 60 TAB | Refills: 3 | Status: SHIPPED | OUTPATIENT
Start: 2018-03-02 | End: 2019-07-11 | Stop reason: ALTCHOICE

## 2018-03-02 RX ORDER — AMLODIPINE BESYLATE 5 MG/1
5 TABLET ORAL DAILY
Qty: 30 TAB | Refills: 3 | Status: SHIPPED | OUTPATIENT
Start: 2018-03-02 | End: 2019-07-11 | Stop reason: SDUPTHER

## 2018-03-02 RX ORDER — LISINOPRIL 20 MG/1
20 TABLET ORAL DAILY
Qty: 30 TAB | Refills: 3 | Status: SHIPPED | OUTPATIENT
Start: 2018-03-02 | End: 2019-07-11

## 2018-03-02 NOTE — MR AVS SNAPSHOT
Aliza Sequeira 
 
 
 [address] [facility] [address] [phone] Patient: Abida Hassan MRN: [id number] :1990 Visit Information Date & Time Provider Department Dept. Phone Encounter #  
 3/2/2018  8:30 AM Will Walker, 3 [facility] [phone] [id number] Follow-up Instructions Return in about 4 weeks (around 3/30/2018). Upcoming Health Maintenance Date Due  
 HPV AGE 9Y-34Y (1 of 3 - Female 3 Dose Series) 2003 DTaP/Tdap/Td series (1 - Tdap) 2013 PAP AKA CERVICAL CYTOLOGY 2013 Influenza Age 5 to Adult 2017 Allergies as of 3/2/2018  Review Complete On: 3/2/2018 By: Will Walker MD  
  
 Severity Noted Reaction Type Reactions Gabapentin  2017    Swelling Pregabalin  2017    Swelling Current Immunizations  Never Reviewed No immunizations on file. Not reviewed this visit You Were Diagnosed With   
  
 Codes Comments Essential hypertension    -  Primary ICD-10-CM: I10 
ICD-9-CM: 401.9 Depression, unspecified depression type     ICD-10-CM: F32.9 ICD-9-CM: 551 Acute left-sided low back pain without sciatica     ICD-10-CM: M54.5 ICD-9-CM: 724.2 Vitals BP Pulse Temp Resp Height(growth percentile) Weight(growth percentile) 154/90 (!) 109 97.8 °F (36.6 °C) (Oral) 18 5' 4\" (1.626 m) 130 lb 6.4 oz (59.1 kg) LMP SpO2 BMI OB Status Smoking Status 2018 100% 22.38 kg/m2 Having regular periods Current Every Day Smoker Vitals History BMI and BSA Data Body Mass Index Body Surface Area  
 22.38 kg/m 2 1.63 m 2 Preferred Pharmacy Pharmacy Name Phone [facility] [phone] [phone] Your Updated Medication List  
  
   
 This list is accurate as of 3/2/18  9:22 AM.  Always use your most recent med list.  
  
  
  
  
  insulin pump Misc Commonly known as:  PATIENT SUPPLIED  
by SubCUTAneous route as needed. amLODIPine 5 mg tablet Commonly known as:  Lysite Sandhoff Take 1 Tab by mouth daily. buPROPion  mg SR tablet Commonly known as:  Lorena Wick Take 1 Tab by mouth two (2) times a day. DULoxetine 60 mg capsule Commonly known as:  CYMBALTA TAKE 1 CAPSULE BY MOUTH DAILY  
  
 lisinopril 20 mg tablet Commonly known as:  Shahab Sandy Take 1 Tab by mouth daily. Prescriptions Sent to Pharmacy Refills  
 amLODIPine (NORVASC) 5 mg tablet 3 Sig: Take 1 Tab by mouth daily. Class: Normal  
 Pharmacy: [facility] [address], [address] [place] [address] Ph #: [phone] Route: Oral  
 lisinopril (PRINIVIL, ZESTRIL) 20 mg tablet 3 Sig: Take 1 Tab by mouth daily. Class: Normal  
 Pharmacy: [facility] [address], [address] [place] [address] Ph #: [phone] Route: Oral  
 DULoxetine (CYMBALTA) 60 mg capsule 0 Sig: TAKE 1 CAPSULE BY MOUTH DAILY Class: Normal  
 Pharmacy: [facility] [address], [address] [facility] AT [facility] Ph #: [phone]  
 buPROPion SR [facility] SR) 150 mg SR tablet 3 Sig: Take 1 Tab by mouth two (2) times a day. Class: Normal  
 Pharmacy: [facility] [address], [address] [place] [address] Ph #: [phone] Route: Oral  
  
Follow-up Instructions Return in about 4 weeks (around 3/30/2018). Introducing [facility] SERVICES! Anya Duarte introduces LoopIt patient portal. Now you can access parts of your medical record, email your doctor's office, and request medication refills online. 1. In your internet browser, go to [email or web address] 2. Click on the First Time User? Click Here link in the Sign In box. You will see the New Member Sign Up page. 3. Enter your Playroom Access Code exactly as it appears below. You will not need to use this code after youve completed the sign-up process. If you do not sign up before the expiration date, you must request a new code. · Playroom Access Code: [email or web address] Expires: 5/24/2018  8:36 AM 
 
4. Enter the last four digits of your Social Security Number (xxxx) and Date of Birth (mm/dd/yyyy) as indicated and click Submit. You will be taken to the next sign-up page. 5. Create a Victorioust ID. This will be your Playroom login ID and cannot be changed, so think of one that is secure and easy to remember. 6. Create a Playroom password. You can change your password at any time. 7. Enter your Password Reset Question and Answer. This can be used at a later time if you forget your password. 8. Enter your e-mail address. You will receive e-mail notification when new information is available in [address] 9. Click Sign Up. You can now view and download portions of your medical record. 10. Click the Download Summary menu link to download a portable copy of your medical information. If you have questions, please visit the Frequently Asked Questions section of the Playroom website. Remember, Playroom is NOT to be used for urgent needs. For medical emergencies, dial 911. Now available from your iPhone and Android! Please provide this summary of care documentation to your next provider. Your primary care clinician is listed as Rossy Conkristine. If you have any questions after today's visit, please call [phone].

## 2018-03-02 NOTE — PROGRESS NOTES
HISTORY OF PRESENT ILLNESS  Ollie Orantes is a 34 y.o. female. HPI  Acute left side low back pain, improved, her pain is much better, only 3/10 when she first get out of bed in am, then no pain during the day    HTN, not controlled, she has not been taking norvasc at all, only taking lisinopril but not today    Depression, not controlled due to she is not taking her meds at all!, she was on wellbutrin and cymbalta but did not get them refilled  Review of Systems   Constitutional: Negative for fever. Respiratory: Negative for cough and shortness of breath. Cardiovascular: Negative for chest pain and palpitations. Gastrointestinal: Negative for abdominal pain and nausea. Genitourinary: Negative for dysuria. Neurological: Positive for headaches (off/on). Physical Exam   Constitutional: She appears well-developed and well-nourished. Cardiovascular: Normal rate, regular rhythm and normal heart sounds. Pulmonary/Chest: Effort normal and breath sounds normal.   Abdominal: Soft. There is no tenderness. Musculoskeletal:        Lumbar back: She exhibits normal range of motion, no tenderness and no spasm. Psychiatric: Her behavior is normal. Judgment and thought content normal. Her mood appears not anxious. Cognition and memory are normal. She exhibits a depressed mood. Vitals reviewed. ASSESSMENT and PLAN  Diagnoses and all orders for this visit:    1. Essential hypertension, not controlled, re add norvasc, discussed compliance with meds  -     amLODIPine (NORVASC) 5 mg tablet; Take 1 Tab by mouth daily. -     lisinopril (PRINIVIL, ZESTRIL) 20 mg tablet; Take 1 Tab by mouth daily. 2. Depression, unspecified depression type, not controlled, restart:  -     DULoxetine (CYMBALTA) 60 mg capsule; TAKE 1 CAPSULE BY MOUTH DAILY  -     buPROPion SR (WELLBUTRIN SR) 150 mg SR tablet; Take 1 Tab by mouth two (2) times a day.     3. Acute left-sided low back pain without sciatica, improved, she will continue flexeril only at night for the next 1-2 weeks, her [employer] forms filled out    rtc 4 wks

## 2018-03-02 NOTE — PROGRESS NOTES
Chief Complaint   Patient presents with    Back Injury     pt requesting temporary FMLA paperwork be filled out for work    Hypertension       Pt preferred language for health care discussion is [place].     Is someone accompanying this pt? no    Is the patient using any DME equipment during OV? no    Depression Screening:  PHQ over the last two weeks 3/2/2018 8/22/2017 10/27/2016 6/30/2016 3/16/2016   PHQ Not Done Active Diagnosis of Depression or Bipolar Disorder - - - -   Little interest or pleasure in doing things Nearly every day Not at all Nearly every day Not at all Several days   Feeling down, depressed or hopeless More than half the days Not at all More than half the days Not at all Several days   Total Score PHQ 2 5 0 5 0 2   Trouble falling or staying asleep, or sleeping too much - - Nearly every day - -   Feeling tired or having little energy - - More than half the days - -   Poor appetite or overeating - - More than half the days - -   Feeling bad about yourself - or that you are a failure or have let yourself or your family down - - More than half the days - -   Trouble concentrating on things such as school, work, reading or watching TV - - Nearly every day - -   Moving or speaking so slowly that other people could have noticed; or the opposite being so fidgety that others notice - - Not at all - -   Thoughts of being better off dead, or hurting yourself in some way - - Not at all - -   PHQ 9 Score - - 17 - -   How difficult have these problems made it for you to do your work, take care of your home and get along with others - - Very difficult - -       Learning Assessment:  Learning Assessment 3/16/2016   PRIMARY LEARNER Patient   HIGHEST LEVEL OF EDUCATION - PRIMARY LEARNER  SOME COLLEGE   BARRIERS PRIMARY LEARNER NONE   CO-LEARNER CAREGIVER No   PRIMARY LANGUAGE ENGLISH   LEARNER PREFERENCE PRIMARY OTHER (COMMENT)   ANSWERED BY patient   RELATIONSHIP SELF       Abuse Screening:  Abuse Screening Questionnaire 2/23/2018 10/18/2016   Do you ever feel afraid of your partner? N N   Are you in a relationship with someone who physically or mentally threatens you? N N   Is it safe for you to go home? Y Y         Health Maintenance reviewed and discussed per provider. Yes    Pt is due for Pap, HPV, Flu, Tdap. Please order/place referral if appropriate. Pt currently taking Antiplatelet therapy? no      Coordination of Care:  1. Have you been to the ER, urgent care clinic since your last visit? Hospitalized since your last visit? [facility]- back injury 2/18/18    2. Have you seen or consulted any other health care providers outside of the [address] [place] [address] since your last visit? Include any pap smears or colon screening.  no

## 2018-04-11 ENCOUNTER — TELEPHONE (OUTPATIENT)
Dept: FAMILY MEDICINE CLINIC | Age: 28
End: 2018-04-11

## 2018-04-11 NOTE — TELEPHONE ENCOUNTER
Pt states she has been having her menstrual cycle every two weeks for the past three months. Pt made an appt for Friday earlier today, but states she's in a lot of pain, as well, feeling weak. Pt wants to know if she should go to the emergency room or wait for her appt Friday.

## 2018-04-11 NOTE — TELEPHONE ENCOUNTER
Spoke with patient, verified with 2 identifiers. Advised that based on her pain and my clinical judgement, patient should proceed to ED. Patient verbalized understanding. Will keep Friday appt for now and call us with an update from ED.

## 2018-04-13 ENCOUNTER — OFFICE VISIT (OUTPATIENT)
Dept: FAMILY MEDICINE CLINIC | Age: 28
End: 2018-04-13

## 2018-04-13 VITALS
RESPIRATION RATE: 20 BRPM | BODY MASS INDEX: 21.68 KG/M2 | DIASTOLIC BLOOD PRESSURE: 84 MMHG | HEART RATE: 102 BPM | SYSTOLIC BLOOD PRESSURE: 110 MMHG | HEIGHT: 64 IN | OXYGEN SATURATION: 98 % | WEIGHT: 127 LBS | TEMPERATURE: 98.5 F

## 2018-04-13 DIAGNOSIS — N92.0 EXCESSIVE AND FREQUENT MENSTRUATION: ICD-10-CM

## 2018-04-13 DIAGNOSIS — N80.9 ENDOMETRIOSIS: Primary | ICD-10-CM

## 2018-04-13 RX ORDER — NAPROXEN 500 MG/1
500 TABLET ORAL 2 TIMES DAILY WITH MEALS
COMMUNITY
End: 2018-04-13 | Stop reason: SDUPTHER

## 2018-04-13 RX ORDER — NAPROXEN 500 MG/1
500 TABLET ORAL 2 TIMES DAILY WITH MEALS
Qty: 30 TAB | Refills: 0 | Status: SHIPPED | OUTPATIENT
Start: 2018-04-13 | End: 2019-07-11 | Stop reason: ALTCHOICE

## 2018-04-13 RX ORDER — HYDROCODONE BITARTRATE AND ACETAMINOPHEN 5; 325 MG/1; MG/1
TABLET ORAL
COMMUNITY
End: 2019-07-11 | Stop reason: ALTCHOICE

## 2018-04-13 NOTE — PROGRESS NOTES
Horace Roberson is a 34 y.o. female (: 1990) presenting to address:    No chief complaint on file. Vitals:    18 1001   BP: 110/84   Pulse: (!) 102   Resp: 20   Temp: 98.5 °F (36.9 °C)   TempSrc: Oral   SpO2: 98%   Weight: 127 lb (57.6 kg)   Height: 5' 4\" (1.626 m)   PainSc:   7   PainLoc: Abdomen   LMP: 2018       Hearing/Vision:   No exam data present    Learning Assessment:     Learning Assessment 3/16/2016   PRIMARY LEARNER Patient   HIGHEST LEVEL OF EDUCATION - PRIMARY LEARNER  SOME COLLEGE   BARRIERS PRIMARY LEARNER NONE   CO-LEARNER CAREGIVER No   PRIMARY LANGUAGE ENGLISH   LEARNER PREFERENCE PRIMARY OTHER (COMMENT)   ANSWERED BY patient   RELATIONSHIP SELF     Depression Screening:     PHQ over the last two weeks 2018   PHQ Not Done Active Diagnosis of Depression or Bipolar Disorder   Little interest or pleasure in doing things -   Feeling down, depressed or hopeless -   Total Score PHQ 2 -   Trouble falling or staying asleep, or sleeping too much -   Feeling tired or having little energy -   Poor appetite or overeating -   Feeling bad about yourself - or that you are a failure or have let yourself or your family down -   Trouble concentrating on things such as school, work, reading or watching TV -   Moving or speaking so slowly that other people could have noticed; or the opposite being so fidgety that others notice -   Thoughts of being better off dead, or hurting yourself in some way -   PHQ 9 Score -   How difficult have these problems made it for you to do your work, take care of your home and get along with others -     Fall Risk Assessment:     Fall Risk Assessment, last 12 mths 2018   Able to walk? Yes   Fall in past 12 months? No     Abuse Screening:     Abuse Screening Questionnaire 2018   Do you ever feel afraid of your partner? N   Are you in a relationship with someone who physically or mentally threatens you? N   Is it safe for you to go home?  Iván Howard Coordination of Care Questionaire:   1. Have you been to the ER, urgent care clinic since your last visit? Hospitalized since your last visit? YES [facility]    2. Have you seen or consulted any other health care providers outside of the [address] [place] [address] since your last visit? Include any pap smears or colon screening. NO    Advanced Directive:   1. Do you have an Advanced Directive? NO    2. Would you like information on Advanced Directives?  NO

## 2018-04-13 NOTE — PROGRESS NOTES
HISTORY OF PRESENT ILLNESS  Ashok Connolly is a 34 y.o. female. HPI  C/o Dx with Endometriosis, but she has been having frequent and excessive menstrual periods, associated with pain,   Review of Systems   Constitutional: Negative for fever and malaise/fatigue. Gastrointestinal: Positive for abdominal pain. Negative for nausea and vomiting. Physical Exam   Abdominal: Soft. There is tenderness in the right lower quadrant, suprapubic area and left lower quadrant. There is no rebound, no guarding and no CVA tenderness. Vitals reviewed. ASSESSMENT and PLAN  Diagnoses and all orders for this visit:    1. Endometriosis  -     naproxen (NAPROSYN) 500 mg tablet; Take 1 Tab by mouth two (2) times daily (with meals).     2. Excessive and frequent menstruation, need further evaluation    Advised to see her Gynecologist  for further management

## 2018-04-24 DIAGNOSIS — N80.9 ENDOMETRIOSIS: ICD-10-CM

## 2018-04-24 RX ORDER — HYDROCODONE BITARTRATE AND ACETAMINOPHEN 5; 325 MG/1; MG/1
TABLET ORAL
OUTPATIENT
Start: 2018-04-24

## 2018-04-24 RX ORDER — NAPROXEN 500 MG/1
500 TABLET ORAL 2 TIMES DAILY WITH MEALS
Qty: 30 TAB | Refills: 0 | OUTPATIENT
Start: 2018-04-24

## 2018-04-25 NOTE — TELEPHONE ENCOUNTER
Attempted to contact patient. Left message to please call office back. Per provider, pain medications for endometriosis are to be given by GYN at this point. Dr. Claire Hernandez no longer manages this.

## 2019-07-11 ENCOUNTER — OFFICE VISIT (OUTPATIENT)
Dept: FAMILY MEDICINE CLINIC | Age: 29
End: 2019-07-11

## 2019-07-11 VITALS
RESPIRATION RATE: 16 BRPM | SYSTOLIC BLOOD PRESSURE: 141 MMHG | HEART RATE: 96 BPM | HEIGHT: 64 IN | BODY MASS INDEX: 21.89 KG/M2 | WEIGHT: 128.2 LBS | DIASTOLIC BLOOD PRESSURE: 93 MMHG | TEMPERATURE: 98.6 F | OXYGEN SATURATION: 98 %

## 2019-07-11 DIAGNOSIS — M79.641 PAIN OF RIGHT HAND: ICD-10-CM

## 2019-07-11 DIAGNOSIS — I10 ESSENTIAL HYPERTENSION: Primary | ICD-10-CM

## 2019-07-11 PROBLEM — F43.10 PTSD (POST-TRAUMATIC STRESS DISORDER): Status: ACTIVE | Noted: 2019-07-11

## 2019-07-11 RX ORDER — AMLODIPINE BESYLATE 5 MG/1
5 TABLET ORAL DAILY
Qty: 30 TAB | Refills: 3 | Status: SHIPPED | OUTPATIENT
Start: 2019-07-11 | End: 2019-12-12

## 2019-07-11 RX ORDER — NAPROXEN 500 MG/1
500 TABLET ORAL
Qty: 30 TAB | Refills: 1 | Status: SHIPPED | OUTPATIENT
Start: 2019-07-11 | End: 2022-02-09

## 2019-07-11 NOTE — PROGRESS NOTES
HISTORY OF PRESENT ILLNESS  Clifford Ruiz is a 34 y.o. female. HPI  HTN, not controlled, she has not been taking any medication for her BP  C/o right hand pain, started a month ago, worse when she work, better with rest, 3-4/10, no swelling or rash, no weakness  Review of Systems   Cardiovascular: Negative for chest pain. Musculoskeletal: Negative for falls. Skin: Negative for rash. Neurological: Negative for weakness and headaches. Physical Exam   Cardiovascular: Normal rate, regular rhythm and normal heart sounds. No murmur heard. Pulmonary/Chest: Effort normal and breath sounds normal.   Musculoskeletal:        Right hand: She exhibits tenderness (fourth and fifth distal metacarpal and proximal fingers). Normal sensation noted. Normal strength noted. Hands:  Vitals reviewed. ASSESSMENT and PLAN  Diagnoses and all orders for this visit:    1. Essential hypertension, not controlled, restart:  -     amLODIPine (NORVASC) 5 mg tablet; Take 1 Tab by mouth daily. 2. Pain of right hand, ? Flexor Tendonitis   -     REFERRAL TO ORTHOPEDICS  -     naproxen (NAPROSYN) 500 mg tablet; Take 1 Tab by mouth two (2) times daily as needed for Pain.     rtc 1 mo

## 2019-07-11 NOTE — PROGRESS NOTES
Jesus Christie is a 34 y.o. female (: 1990) presenting to address:    Chief Complaint   Patient presents with    Medication Evaluation     Follow up        Vitals:    19 0847   BP: (!) 141/93   Pulse: 96   Resp: 16   Temp: 98.6 °F (37 °C)   SpO2: 98%   Weight: 128 lb 3.2 oz (58.2 kg)   Height: 5' 4\" (1.626 m)   PainSc:   3   PainLoc: Hand   LMP: 06/15/2019       Hearing/Vision:   No exam data present    Learning Assessment:     Learning Assessment 3/16/2016   PRIMARY LEARNER Patient   HIGHEST LEVEL OF EDUCATION - PRIMARY LEARNER  SOME COLLEGE   BARRIERS PRIMARY LEARNER NONE   CO-LEARNER CAREGIVER No   PRIMARY LANGUAGE ENGLISH   LEARNER PREFERENCE PRIMARY OTHER (COMMENT)   ANSWERED BY patient   RELATIONSHIP SELF     Depression Screening:     3 most recent PHQ Screens 2019   PHQ Not Done -   Little interest or pleasure in doing things Not at all   Feeling down, depressed, irritable, or hopeless Not at all   Total Score PHQ 2 0   Trouble falling or staying asleep, or sleeping too much -   Feeling tired or having little energy -   Poor appetite, weight loss, or overeating -   Feeling bad about yourself - or that you are a failure or have let yourself or your family down -   Trouble concentrating on things such as school, work, reading, or watching TV -   Moving or speaking so slowly that other people could have noticed; or the opposite being so fidgety that others notice -   Thoughts of being better off dead, or hurting yourself in some way -   PHQ 9 Score -   How difficult have these problems made it for you to do your work, take care of your home and get along with others -     Fall Risk Assessment:     Fall Risk Assessment, last 12 mths 2018   Able to walk? Yes   Fall in past 12 months? No     Abuse Screening:     Abuse Screening Questionnaire 2018   Do you ever feel afraid of your partner? N   Are you in a relationship with someone who physically or mentally threatens you?  N   Is it safe for you to go home? Y     Coordination of Care Questionaire:   1. Have you been to the ER, urgent care clinic since your last visit? Hospitalized since your last visit? YES  [facility] 3 weeks ago     2. Have you seen or consulted any other health care providers outside of the [address] [place] [address] since your last visit? Include any pap smears or colon screening. NO    Advanced Directive:   1. Do you have an Advanced Directive? NO    2. Would you like information on Advanced Directives?  YES

## 2019-09-06 ENCOUNTER — OFFICE VISIT (OUTPATIENT)
Dept: FAMILY MEDICINE CLINIC | Age: 29
End: 2019-09-06

## 2019-09-06 VITALS
BODY MASS INDEX: 23.05 KG/M2 | RESPIRATION RATE: 20 BRPM | OXYGEN SATURATION: 96 % | DIASTOLIC BLOOD PRESSURE: 82 MMHG | SYSTOLIC BLOOD PRESSURE: 131 MMHG | TEMPERATURE: 98.5 F | HEART RATE: 93 BPM | HEIGHT: 64 IN | WEIGHT: 135 LBS

## 2019-09-06 DIAGNOSIS — I10 ESSENTIAL HYPERTENSION: Primary | ICD-10-CM

## 2019-09-06 DIAGNOSIS — F43.10 PTSD (POST-TRAUMATIC STRESS DISORDER): ICD-10-CM

## 2019-09-06 RX ORDER — SERTRALINE HYDROCHLORIDE 50 MG/1
TABLET, FILM COATED ORAL
Qty: 30 TAB | Refills: 1 | Status: SHIPPED | OUTPATIENT
Start: 2019-09-06 | End: 2019-11-17 | Stop reason: SDUPTHER

## 2019-09-06 NOTE — PROGRESS NOTES
HISTORY OF PRESENT ILLNESS  Marian Katz is a 34 y.o. female. HPI  HTN, stable, on norvasc daily  PTSD, she has been seen by therapist, feels ok but still feel down at times and having flash backs of \"rough childhood experiences\"  For right little trigger finger surgery  Seen by Endocrinology for her DM  Review of Systems   Constitutional: Negative for chills and fever. Respiratory: Negative for shortness of breath. Cardiovascular: Negative for chest pain and palpitations. Psychiatric/Behavioral: The patient does not have insomnia. Physical Exam   Cardiovascular: Normal rate, regular rhythm and normal heart sounds. No murmur heard. Pulmonary/Chest: Effort normal and breath sounds normal. She has no rales. Abdominal: Soft. There is no tenderness. Psychiatric: She has a normal mood and affect. Her speech is normal and behavior is normal. Thought content normal.   Vitals reviewed. ASSESSMENT and PLAN  Diagnoses and all orders for this visit:    1. Essential hypertension, controlled    2. PTSD (post-traumatic stress disorder)  -     sertraline (ZOLOFT) 50 mg tablet; Take half tablet daily for 6 days, then increase dose to one tablet daily  Indications: Posttraumatic Stress Syndrome    Stable for her surgery with Jenifer block. She will not take any insulin on the morning of her surgery, resume after she resume her po intake  She will contact her Endocrinologist also and ask if any any further instructions regarding her insulin, we contacted the office today and left message for them to call us back. Anthony Pineda NP, Endocrinology called back stating that pt should continue her basal insulin through her insulin pump, but no additional insulin since she will be fasting, she will call pt now and explain that to her, I called the pt also but she did not answer her phone, we will try to reach her again later.     rtc 6 wks

## 2019-09-06 NOTE — PROGRESS NOTES
Kel Ramesh is a 34 y.o. female (: 1990) presenting to address:    Chief Complaint   Patient presents with    Pre-op Exam       Vitals:    19 1048   BP: 131/82   Pulse: 93   Resp: 20   Temp: 98.5 °F (36.9 °C)   TempSrc: Oral   SpO2: 96%   Weight: 135 lb (61.2 kg)   Height: 5' 4\" (1.626 m)   PainSc:   5   PainLoc: Generalized       Learning Assessment:     Learning Assessment 3/16/2016   PRIMARY LEARNER Patient   HIGHEST LEVEL OF EDUCATION - PRIMARY LEARNER  SOME COLLEGE   BARRIERS PRIMARY LEARNER NONE   CO-LEARNER CAREGIVER No   PRIMARY LANGUAGE ENGLISH   LEARNER PREFERENCE PRIMARY OTHER (COMMENT)   ANSWERED BY patient   RELATIONSHIP SELF     Depression Screening:     3 most recent PHQ Screens 2019   PHQ Not Done Active Diagnosis of Depression or Bipolar Disorder   Little interest or pleasure in doing things -   Feeling down, depressed, irritable, or hopeless -   Total Score PHQ 2 -   Trouble falling or staying asleep, or sleeping too much -   Feeling tired or having little energy -   Poor appetite, weight loss, or overeating -   Feeling bad about yourself - or that you are a failure or have let yourself or your family down -   Trouble concentrating on things such as school, work, reading, or watching TV -   Moving or speaking so slowly that other people could have noticed; or the opposite being so fidgety that others notice -   Thoughts of being better off dead, or hurting yourself in some way -   PHQ 9 Score -   How difficult have these problems made it for you to do your work, take care of your home and get along with others -     Fall Risk Assessment:     Fall Risk Assessment, last 12 mths 2019   Able to walk? Yes   Fall in past 12 months? No     Abuse Screening:     Abuse Screening Questionnaire 2019   Do you ever feel afraid of your partner? N   Are you in a relationship with someone who physically or mentally threatens you? N   Is it safe for you to go home?  Y     Coordination of Care Questionaire:   1. Have you been to the ER, urgent care clinic since your last visit? Hospitalized since your last visit? YES- [facility]    2. Have you seen or consulted any other health care providers outside of the [address] [place] [address] since your last visit? Include any pap smears or colon screening. YES- PT, hand specialists    Advanced Directive:   1. Do you have an Advanced Directive? YES    2. Would you like information on Advanced Directives?  NO

## 2019-12-12 ENCOUNTER — OFFICE VISIT (OUTPATIENT)
Dept: FAMILY MEDICINE CLINIC | Age: 29
End: 2019-12-12

## 2019-12-12 VITALS
RESPIRATION RATE: 20 BRPM | DIASTOLIC BLOOD PRESSURE: 86 MMHG | HEIGHT: 64 IN | SYSTOLIC BLOOD PRESSURE: 136 MMHG | HEART RATE: 99 BPM | WEIGHT: 143 LBS | BODY MASS INDEX: 24.41 KG/M2 | TEMPERATURE: 97.5 F | OXYGEN SATURATION: 100 %

## 2019-12-12 DIAGNOSIS — J06.9 ACUTE URI: Primary | ICD-10-CM

## 2019-12-12 LAB
S PYO AG THROAT QL: NEGATIVE
VALID INTERNAL CONTROL?: YES

## 2019-12-12 RX ORDER — FLUTICASONE PROPIONATE 50 MCG
SPRAY, SUSPENSION (ML) NASAL
Qty: 1 BOTTLE | Refills: 0 | Status: SHIPPED | OUTPATIENT
Start: 2019-12-12

## 2019-12-12 RX ORDER — BUTALBITAL, ACETAMINOPHEN AND CAFFEINE 50; 325; 40 MG/1; MG/1; MG/1
1 TABLET ORAL
COMMUNITY
End: 2022-02-09

## 2019-12-12 RX ORDER — BENZONATATE 200 MG/1
200 CAPSULE ORAL
Qty: 30 CAP | Refills: 0 | Status: SHIPPED | OUTPATIENT
Start: 2019-12-12 | End: 2022-02-09

## 2019-12-12 RX ORDER — AMLODIPINE AND VALSARTAN 10; 160 MG/1; MG/1
1 TABLET ORAL DAILY
COMMUNITY
End: 2022-09-27 | Stop reason: SDUPTHER

## 2019-12-12 NOTE — PROGRESS NOTES
Ami Arellano is a 34 y.o. female (: 1990) presenting to address:    Chief Complaint   Patient presents with    Cough    Headache    Other     Forgetful       Vitals:    19 1018 19 1035 19 1056   BP: (!) 160/100 (!) 155/95 136/86   Pulse: 99     Resp: 20     Temp: 97.5 °F (36.4 °C)     TempSrc: Oral     SpO2: 100%     Weight: 143 lb (64.9 kg)     Height: 5' 4\" (1.626 m)     PainSc:   0 - No pain     LMP: 2019       Learning Assessment:     Learning Assessment 3/16/2016   PRIMARY LEARNER Patient   HIGHEST LEVEL OF EDUCATION - PRIMARY LEARNER  SOME COLLEGE   BARRIERS PRIMARY LEARNER NONE   CO-LEARNER CAREGIVER No   PRIMARY LANGUAGE ENGLISH   LEARNER PREFERENCE PRIMARY OTHER (COMMENT)   ANSWERED BY patient   RELATIONSHIP SELF     Depression Screening:     3 most recent PHQ Screens 2019   PHQ Not Done Active Diagnosis of Depression or Bipolar Disorder   Little interest or pleasure in doing things -   Feeling down, depressed, irritable, or hopeless -   Total Score PHQ 2 -   Trouble falling or staying asleep, or sleeping too much -   Feeling tired or having little energy -   Poor appetite, weight loss, or overeating -   Feeling bad about yourself - or that you are a failure or have let yourself or your family down -   Trouble concentrating on things such as school, work, reading, or watching TV -   Moving or speaking so slowly that other people could have noticed; or the opposite being so fidgety that others notice -   Thoughts of being better off dead, or hurting yourself in some way -   PHQ 9 Score -   How difficult have these problems made it for you to do your work, take care of your home and get along with others -     Fall Risk Assessment:     Fall Risk Assessment, last 12 mths 2019   Able to walk? Yes   Fall in past 12 months? No     Abuse Screening:     Abuse Screening Questionnaire 2019   Do you ever feel afraid of your partner?  N   Are you in a relationship with someone who physically or mentally threatens you? N   Is it safe for you to go home? Y     Coordination of Care Questionaire:   1. Have you been to the ER, urgent care clinic since your last visit? Hospitalized since your last visit? YES- [facility]    2. Have you seen or consulted any other health care providers outside of the [address] [place] [address] since your last visit? Include any pap smears or colon screening. YES- endocrinology    Advanced Directive:   1. Do you have an Advanced Directive? YES    2. Would you like information on Advanced Directives?  NO

## 2019-12-12 NOTE — PROGRESS NOTES
HISTORY OF PRESENT ILLNESS  Ubaldo Katz is a 34 y.o. female. HPI  C/o cough/congestion, no wheezing or sob, started 2 days ago, no fever or chills  Pt will see Psychiatry today for difficulty concentrating/easily distracted and not able to complete her tasks recently, ? ADD  Review of Systems   Constitutional: Negative for chills and fever. HENT: Positive for sore throat. Negative for ear pain. Respiratory: Negative for shortness of breath and wheezing. Cardiovascular: Negative for chest pain and palpitations. Neurological: Positive for headaches. Physical Exam  Constitutional:       Appearance: Normal appearance. HENT:      Right Ear: Tympanic membrane and ear canal normal.      Left Ear: Tympanic membrane and ear canal normal.      Nose: Congestion present. Right Sinus: No maxillary sinus tenderness or frontal sinus tenderness. Left Sinus: No maxillary sinus tenderness or frontal sinus tenderness. Mouth/Throat:      Pharynx: Posterior oropharyngeal erythema present. No oropharyngeal exudate. Cardiovascular:      Rate and Rhythm: Normal rate and regular rhythm. Pulmonary:      Effort: Pulmonary effort is normal.      Breath sounds: Normal breath sounds. No wheezing or rhonchi. Abdominal:      Palpations: Abdomen is soft. Tenderness: There is no tenderness. Neurological:      Mental Status: She is alert and oriented to person, place, and time. ASSESSMENT and PLAN  Diagnoses and all orders for this visit:    1. Acute URI, most likely viral  -     AMB POC RAPID STREP A  -     benzonatate (TESSALON) 200 mg capsule; Take 1 Cap by mouth three (3) times daily as needed for Cough.   -     fluticasone propionate (FLONASE) 50 mcg/actuation nasal spray; 2 sprays in each nostril daily    Results for orders placed or performed in visit on 12/12/19   AMB POC RAPID STREP A   Result Value Ref Range    VALID INTERNAL CONTROL POC Yes     Group A Strep Ag Negative Negative

## 2019-12-12 NOTE — PATIENT INSTRUCTIONS
You may take Coricidin HBP as needed     Viral Respiratory Infection: Care Instructions  Your Care Instructions    Viruses are very small organisms. They grow in number after they enter your body. There are many types that cause different illnesses, such as colds and the mumps. The symptoms of a viral respiratory infection often start quickly. They include a fever, sore throat, and runny nose. You may also just not feel well. Or you may not want to eat much. Most viral respiratory infections are not serious. They usually get better with time and self-care. Antibiotics are not used to treat a viral infection. That's because antibiotics will not help cure a viral illness. In some cases, antiviral medicine can help your body fight a serious viral infection. Follow-up care is a key part of your treatment and safety. Be sure to make and go to all appointments, and call your doctor if you are having problems. It's also a good idea to know your test results and keep a list of the medicines you take. How can you care for yourself at home? · Rest as much as possible until you feel better. · Be safe with medicines. Take your medicine exactly as prescribed. Call your doctor if you think you are having a problem with your medicine. You will get more details on the specific medicine your doctor prescribes. · Take an over-the-counter pain medicine, such as acetaminophen (Tylenol), ibuprofen (Advil, Motrin), or naproxen (Aleve), as needed for pain and fever. Read and follow all instructions on the label. Do not give aspirin to anyone younger than 20. It has been linked to Reye syndrome, a serious illness. · Drink plenty of fluids, enough so that your urine is light yellow or clear like water. Hot fluids, such as tea or soup, may help relieve congestion in your nose and throat.  If you have kidney, heart, or liver disease and have to limit fluids, talk with your doctor before you increase the amount of fluids you drink.  · Try to clear mucus from your lungs by breathing deeply and coughing. · Gargle with warm salt water once an hour. This can help reduce swelling and throat pain. Use 1 teaspoon of salt mixed in 1 cup of warm water. · Do not smoke or allow others to smoke around you. If you need help quitting, talk to your doctor about stop-smoking programs and medicines. These can increase your chances of quitting for good. To avoid spreading the virus  · Cough or sneeze into a tissue. Then throw the tissue away. · If you don't have a tissue, use your hand to cover your cough or sneeze. Then clean your hand. You can also cough into your sleeve. · Wash your hands often. Use soap and warm water. Wash for 15 to 20 seconds each time. · If you don't have soap and water near you, you can clean your hands with alcohol wipes or gel. When should you call for help? Call your doctor now or seek immediate medical care if:    · You have a new or higher fever.     · Your fever lasts more than 48 hours.     · You have trouble breathing.     · You have a fever with a stiff neck or a severe headache.     · You are sensitive to light.     · You feel very sleepy or confused.    Watch closely for changes in your health, and be sure to contact your doctor if:    · You do not get better as expected. Where can you learn more? Go to http://gail-michelle.info/. Enter I941 in the search box to learn more about \"Viral Respiratory Infection: Care Instructions. \"  Current as of: June 9, 2019  Content Version: 12.2  © 2485-2134 [employer]. Care instructions adapted under license by [employer] (which disclaims liability or warranty for this information). If you have questions about a medical condition or this instruction, always ask your healthcare professional. [address] any warranty or liability for your use of this information.          Upper Respiratory Infection (Cold): Care Instructions  Your Care Instructions    An upper respiratory infection, or URI, is an infection of the nose, sinuses, or throat. URIs are spread by coughs, sneezes, and direct contact. The common cold is the most frequent kind of URI. The flu and sinus infections are other kinds of URIs. Almost all URIs are caused by viruses. Antibiotics won't cure them. But you can treat most infections with home care. This may include drinking lots of fluids and taking over-the-counter pain medicine. You will probably feel better in 4 to 10 days. The doctor has checked you carefully, but problems can develop later. If you notice any problems or new symptoms, get medical treatment right away. Follow-up care is a key part of your treatment and safety. Be sure to make and go to all appointments, and call your doctor if you are having problems. It's also a good idea to know your test results and keep a list of the medicines you take. How can you care for yourself at home? · To prevent dehydration, drink plenty of fluids, enough so that your urine is light yellow or clear like water. Choose water and other caffeine-free clear liquids until you feel better. If you have kidney, heart, or liver disease and have to limit fluids, talk with your doctor before you increase the amount of fluids you drink. · Take an over-the-counter pain medicine, such as acetaminophen (Tylenol), ibuprofen (Advil, Motrin), or naproxen (Aleve). Read and follow all instructions on the label. · Before you use cough and cold medicines, check the label. These medicines may not be safe for young children or for people with certain health problems. · Be careful when taking over-the-counter cold or flu medicines and Tylenol at the same time. Many of these medicines have acetaminophen, which is Tylenol. Read the labels to make sure that you are not taking more than the recommended dose. Too much acetaminophen (Tylenol) can be harmful.   · Get plenty of rest.  · Do not smoke or allow others to smoke around you. If you need help quitting, talk to your doctor about stop-smoking programs and medicines. These can increase your chances of quitting for good. When should you call for help? Call 911 anytime you think you may need emergency care. For example, call if:    · You have severe trouble breathing.    Call your doctor now or seek immediate medical care if:    · You seem to be getting much sicker.     · You have new or worse trouble breathing.     · You have a new or higher fever.     · You have a new rash.    Watch closely for changes in your health, and be sure to contact your doctor if:    · You have a new symptom, such as a sore throat, an earache, or sinus pain.     · You cough more deeply or more often, especially if you notice more mucus or a change in the color of your mucus.     · You do not get better as expected. Where can you learn more? Go to http://gail-michelle.info/. Enter U893 in the search box to learn more about \"Upper Respiratory Infection (Cold): Care Instructions. \"  Current as of: June 9, 2019  Content Version: 12.2  © 0429-6284 [employer]. Care instructions adapted under license by [employer] (which disclaims liability or warranty for this information). If you have questions about a medical condition or this instruction, always ask your healthcare professional. [address] any warranty or liability for your use of this information.

## 2019-12-16 ENCOUNTER — TELEPHONE (OUTPATIENT)
Dept: FAMILY MEDICINE CLINIC | Age: 29
End: 2019-12-16

## 2019-12-16 NOTE — TELEPHONE ENCOUNTER
Patient called wanting to let Andre Sánchez know that she has gotten worse since her last visit and she stated that he told her to call back if she gets worse. She is stating that she is coughing up yellow mucus and she has a rattle in her chest. Please advise.

## 2019-12-20 RX ORDER — ALBUTEROL SULFATE 90 UG/1
1 AEROSOL, METERED RESPIRATORY (INHALATION)
Qty: 1 INHALER | Refills: 0 | Status: SHIPPED | OUTPATIENT
Start: 2019-12-20

## 2019-12-20 RX ORDER — DOXYCYCLINE 100 MG/1
100 CAPSULE ORAL 2 TIMES DAILY
Qty: 20 CAP | Refills: 0 | Status: SHIPPED | OUTPATIENT
Start: 2019-12-20 | End: 2019-12-30

## 2019-12-20 NOTE — TELEPHONE ENCOUNTER
Rx for Abx, Doxycycline sent to her pharmacy, and albuterol inhaler prn  And advise pt to take Mucinex DM otc also

## 2021-05-18 ENCOUNTER — TELEPHONE (OUTPATIENT)
Dept: FAMILY MEDICINE CLINIC | Age: 31
End: 2021-05-18

## 2021-05-18 NOTE — TELEPHONE ENCOUNTER
Pt's mother called to inform us pt was admitted to [facility] [place] yesterday. Her potassium was 7 and glucose over 1000. I will forward the message. I asked mother Idania Jas (on phi) to have patient schedule a follow up visit after discharge.

## 2021-05-24 ENCOUNTER — TELEPHONE (OUTPATIENT)
Dept: FAMILY MEDICINE CLINIC | Age: 31
End: 2021-05-24

## 2021-05-24 NOTE — TELEPHONE ENCOUNTER
Patient called stating that she went to the ER and was admitted and released. She missed her appt. With Zander due to her being in the hospital. She states that her numbers were low when they gurvinder her blood and she has some concerns about her levels. Her  stated that she needs to have labs drawn with doctor and compared to the hospital. She would like to have lab orders placed if possible.  Please advise    Future Appointments   Date Time Provider Niesha Jean   6/8/2021  3:00 PM Kashif Fermin MD [facility]

## 2021-05-25 DIAGNOSIS — I10 ESSENTIAL HYPERTENSION: Primary | ICD-10-CM

## 2021-05-25 DIAGNOSIS — E55.9 VITAMIN D DEFICIENCY: ICD-10-CM

## 2021-05-25 DIAGNOSIS — Z11.59 NEED FOR HEPATITIS C SCREENING TEST: ICD-10-CM

## 2021-05-26 DIAGNOSIS — I10 ESSENTIAL HYPERTENSION: ICD-10-CM

## 2021-05-26 DIAGNOSIS — D64.9 ANEMIA, UNSPECIFIED TYPE: Primary | ICD-10-CM

## 2021-05-26 NOTE — TELEPHONE ENCOUNTER
Patient called back and went ahead and scheduled a lab appointment. Patient was also informed to schedule with endo and she states that she has an appointment with them today.     Future Appointments   Date Time Provider Niesha Miranda   5/28/2021  8:40 AM [facility] AM[facility]   6/8/2021  3:00 PM Meño Fermin MD [facility] AMB

## 2021-06-01 ENCOUNTER — APPOINTMENT (OUTPATIENT)
Dept: FAMILY MEDICINE CLINIC | Age: 31
End: 2021-06-01

## 2021-06-01 DIAGNOSIS — E55.9 VITAMIN D DEFICIENCY: ICD-10-CM

## 2021-06-01 DIAGNOSIS — D64.9 ANEMIA, UNSPECIFIED TYPE: ICD-10-CM

## 2021-06-01 DIAGNOSIS — I10 ESSENTIAL HYPERTENSION: ICD-10-CM

## 2021-06-01 DIAGNOSIS — Z11.59 NEED FOR HEPATITIS C SCREENING TEST: ICD-10-CM

## 2021-06-03 LAB
CREATININE, EXTERNAL: 1.42
LDL-C, EXTERNAL: 85

## 2021-06-10 ENCOUNTER — TELEPHONE (OUTPATIENT)
Dept: FAMILY MEDICINE CLINIC | Age: 31
End: 2021-06-10

## 2021-06-10 NOTE — TELEPHONE ENCOUNTER
Patient states that she rescheduled for her hospital f/u for the 14 th of June at 2:00pm. patient had to cancel the original appt because she was not feeling well.  Patient did blood work last week on Thursday 6/3/2021 patient is still not feeling well ever since she has gotten out of the hospital.

## 2021-06-10 NOTE — TELEPHONE ENCOUNTER
Pt called today, leaving vm at nurses station. Pt said she had labs recently and didn't get any results. She asked for a call back. Pt no-showed to her hospital follow up appt 6//8/2021. I called pt to reschedule, no answer, vm is full, cannot leave message.

## 2021-06-11 RX ORDER — ERGOCALCIFEROL 1.25 MG/1
50000 CAPSULE ORAL
Qty: 8 CAPSULE | Refills: 2 | Status: SHIPPED | OUTPATIENT
Start: 2021-06-14 | End: 2022-02-09 | Stop reason: SDUPTHER

## 2021-06-11 RX ORDER — FOLIC ACID 1 MG/1
1 TABLET ORAL DAILY
Qty: 30 TABLET | Refills: 2 | Status: SHIPPED | OUTPATIENT
Start: 2021-06-11 | End: 2022-09-27 | Stop reason: SDUPTHER

## 2021-06-11 RX ORDER — FERROUS GLUCONATE 324(37.5)
324 TABLET ORAL 2 TIMES DAILY
Qty: 60 TABLET | Refills: 2 | Status: SHIPPED | OUTPATIENT
Start: 2021-06-11 | End: 2022-02-09

## 2021-06-11 NOTE — TELEPHONE ENCOUNTER
Spoke earlier with pt, she missed her appt few days ago!, she stated that she was told it it next week!, she has h/o iron def anemia, her Hgb was 7.5 when discharged, and 7.2 on recent labs, she stated that she had IV iron infusions in the past due to not able to tolerate po iron, labs faxed to Dr Andrew Muñoz, pt will call his office and schedule appt soon, Rx for ferrous gluconate was sent earlier, pt stated that she will not pick it up  Vit D was low @ 6, she will start vit d, Rx sent  Also will start folic acid daily, her FA was 1.9  Advised to follow up in 2-3 months, sooner if needed

## 2022-01-20 LAB — HBA1C MFR BLD HPLC: 10.5 %

## 2022-02-09 ENCOUNTER — OFFICE VISIT (OUTPATIENT)
Dept: FAMILY MEDICINE CLINIC | Age: 32
End: 2022-02-09
Payer: MEDICAID

## 2022-02-09 VITALS
BODY MASS INDEX: 20.14 KG/M2 | WEIGHT: 118 LBS | DIASTOLIC BLOOD PRESSURE: 78 MMHG | SYSTOLIC BLOOD PRESSURE: 112 MMHG | HEART RATE: 87 BPM | OXYGEN SATURATION: 100 % | HEIGHT: 64 IN | RESPIRATION RATE: 14 BRPM | TEMPERATURE: 97.1 F

## 2022-02-09 DIAGNOSIS — E53.8 FOLATE DEFICIENCY: ICD-10-CM

## 2022-02-09 DIAGNOSIS — R53.83 FATIGUE, UNSPECIFIED TYPE: Primary | ICD-10-CM

## 2022-02-09 DIAGNOSIS — N18.32 STAGE 3B CHRONIC KIDNEY DISEASE (HCC): ICD-10-CM

## 2022-02-09 DIAGNOSIS — E55.9 VITAMIN D DEFICIENCY: ICD-10-CM

## 2022-02-09 PROCEDURE — 99214 OFFICE O/P EST MOD 30 MIN: CPT | Performed by: INTERNAL MEDICINE

## 2022-02-09 RX ORDER — BUPROPION HYDROCHLORIDE 150 MG/1
TABLET, EXTENDED RELEASE ORAL
COMMUNITY
End: 2022-02-09

## 2022-02-09 RX ORDER — ERGOCALCIFEROL 1.25 MG/1
50000 CAPSULE ORAL
Qty: 4 CAPSULE | Refills: 2 | Status: SHIPPED | OUTPATIENT
Start: 2022-02-09 | End: 2022-02-09 | Stop reason: SDUPTHER

## 2022-02-09 RX ORDER — TRAZODONE HYDROCHLORIDE 50 MG/1
50 TABLET ORAL
COMMUNITY
End: 2022-02-09

## 2022-02-09 RX ORDER — DEXTROAMPHETAMINE SACCHARATE, AMPHETAMINE ASPARTATE, DEXTROAMPHETAMINE SULFATE AND AMPHETAMINE SULFATE 5; 5; 5; 5 MG/1; MG/1; MG/1; MG/1
20 TABLET ORAL 2 TIMES DAILY
COMMUNITY

## 2022-02-09 RX ORDER — ERGOCALCIFEROL 1.25 MG/1
50000 CAPSULE ORAL
Qty: 4 CAPSULE | Refills: 3 | Status: SHIPPED | OUTPATIENT
Start: 2022-02-09 | End: 2022-09-27 | Stop reason: SDUPTHER

## 2022-02-09 RX ORDER — QUETIAPINE FUMARATE 100 MG/1
TABLET, FILM COATED ORAL
COMMUNITY
Start: 2022-01-13

## 2022-02-09 RX ORDER — PRAZOSIN HYDROCHLORIDE 2 MG/1
2 CAPSULE ORAL
COMMUNITY

## 2022-02-09 NOTE — PROGRESS NOTES
HISTORY OF PRESENT ILLNESS  Savannah Ignacio is a 29 y.o. female. HPI  C/o fatigue, started 3 weeks ago after pt was discharged from the hospital, she was admitted for dental infection/abscessed teeth on 1-19-22 and discharged on 1-21-22, she had I&D done by oral surgery on 1-20-22 and discharged on Augmentin, she followed up with her surgeon a week later, she has been feeling tired since his discharge, no dizziness, no c/p or sob, her D/C summary noted today. Vit D def, not controlled, she is not taking any vit D now, her recent level was 21, she ran out of vit D few months ago  Folate def, not controlled, she took folate for 3 months then ran out, will repeat labs  Stage 3 CKD, stable, her recent GFR was 42.5, it has been in that range since May 2021, she is not seen by Nephrology  Type 1 DM, followed by Endocrinology, on insulin pump  She is followed by Hematology for her Iron def anemia, gets iron infusion since she does not tolerate po iron  Review of Systems   Constitutional: Positive for malaise/fatigue. Negative for fever. Respiratory: Negative for cough and shortness of breath. Cardiovascular: Negative for chest pain, palpitations and leg swelling. Gastrointestinal: Negative for abdominal pain. Physical Exam  Vitals reviewed. Cardiovascular:      Rate and Rhythm: Normal rate and regular rhythm. Heart sounds: Normal heart sounds. No murmur heard. Pulmonary:      Effort: Pulmonary effort is normal.      Breath sounds: Normal breath sounds. Abdominal:      Palpations: Abdomen is soft. Tenderness: There is no abdominal tenderness. Neurological:      Mental Status: She is oriented to person, place, and time. Psychiatric:         Attention and Perception: Attention normal.         Mood and Affect: Mood and affect normal.         ASSESSMENT and PLAN  Diagnoses and all orders for this visit:    1. Fatigue, unspecified type, ? etiology  -     CBC WITH AUTOMATED DIFF;  Future  - METABOLIC PANEL, BASIC; Future    2. Vitamin D deficiency, not controlled  -     VITAMIN D, 25 HYDROXY; Future  -     ergocalciferol (ERGOCALCIFEROL) 1,250 mcg (50,000 unit) capsule; Take 1 Capsule by mouth every seven (7) days. 3. Folate deficiency, not controlled, will recheck her folate level  -     FOLATE; Future    4. Stage 3b chronic kidney disease ([address] stable  -     REFERRAL TO NEPHROLOGY  -     METABOLIC PANEL, BASIC; Future      Follow-up and Dispositions    · Return in about 3 months (around 5/9/2022). Lab Results   Component Value Date/Time    Vitamin D, 25-OH, Total 20.8 (L) 11/02/2021 01:30 PM       Lab Results   Component Value Date/Time    Sodium 138 11/02/2021 01:30 PM    Potassium 4.2 11/02/2021 01:30 PM    Chloride 107 11/02/2021 01:30 PM    CO2 19 (L) 11/02/2021 01:30 PM    Anion gap 12 11/02/2021 01:30 PM    Glucose 265 (H) 11/02/2021 01:30 PM    BUN 20 11/02/2021 01:30 PM    Creatinine 1.9 (H) 11/02/2021 01:30 PM    BUN/Creatinine ratio 21 (H) 10/30/2014 02:00 PM    GFR est AA 40.0 11/02/2021 01:30 PM    GFR est non-AA 33 11/02/2021 01:30 PM    Calcium 8.8 11/02/2021 01:30 PM     BASIC METABOLIC PANEL  Specimen:  Blood - Blood (substance)   Ref Range & Units 2 wk ago Comments   Potassium 3.5 - 5.5 mmol/L 5.4     Sodium 133 - 145 mmol/L 131 Low      Chloride 98 - 110 mmol/L 102     Glucose 70 - 99 mg/dL 268 High      Calcium 8.4 - 10.5 mg/dL 8.4     BUN 6 - 22 mg/dL 9     Creatinine 0.5 - 1.2 mg/dL 1.7 High      CO2 20 - 32 mmol/L 18 Low      eGFR  >60.0 42.5 Low      eGFR Non African American >60.0 35.1 Low      Anion Gap 3.0 - 15.0 mmol/L 11.0  Anion Gap calculation based on electrolyte reference ranges. Resulting Agency  [facility]      Narrative  Performed by Portia Penn  Estimated GFR results are reported in mL/min/1.73 sq.m. by the MDRD equation. This eGFR is validated for stable chronic renal failure patients.  This equation is unreliable in acute illness or patients with normal renal function. Specimen Collected: 01/21/22  3:38 AM Last Resulted: 01/21/22  4:14 AM     Contains abnormal data CBC WITH DIFFERENTIAL AUTO  Specimen:  Blood - Blood (substance)   Ref Range & Units 2 wk ago   WBC x 10*3 4.0 - 11.0 K/uL 10.4    RBC x 10^6 3. 80 - 5.20 M/uL 3.12 Low     HGB 11.7 - 15.5 g/dL 9.8 Low     HCT 35.1 - 46.5 % 30.0 Low     MCV 80 - 99 fL 96    MCH 26 - 34 pg 31    MCHC 31 - 36 g/dL 33    RDW 10.0 - 15.5 % 12.1    Platelet 302 - 771 K/uL 485 High     MPV 9.0 - 13.0 fL 10.1    Segmented Neutrophils (Auto) 40 - 75 % 75    Lymphocytes (Auto) 20 - 45 % 16 Low     Monocytes (Auto) 3 - 12 % 6    Eosinophils (Auto) 0 - 6 % 3    Basophils (Auto) 0 - 2 % 1    Absolute Neutrophils (Auto) 1.8 - 7.7 K/uL 7.8 High     Absolute Lymphocytes (Auto) 1.0 - 4.8 K/uL 1.6    Absolute Monocytes (Auto) 0.1 - 1.0 K/uL 0.6    Absolute Eosinophils (Auto) 0.0 - 0.5 K/uL 0.3    Absolute Basophils (Auto) 0.0 - 0.2 K/uL 0.1    Resulting Agency  [facility]   Specimen Collected: 01/21/22  3:38 AM Last Resulted: 01/21/22  3:58 AM       Lab Results   Component Value Date/Time    TSH 1.630 11/02/2021 01:30 PM

## 2022-02-25 ENCOUNTER — TELEPHONE (OUTPATIENT)
Dept: FAMILY MEDICINE CLINIC | Age: 32
End: 2022-02-25

## 2022-03-20 PROBLEM — F43.10 PTSD (POST-TRAUMATIC STRESS DISORDER): Status: ACTIVE | Noted: 2019-07-11

## 2022-07-27 ENCOUNTER — TELEPHONE (OUTPATIENT)
Dept: FAMILY MEDICINE CLINIC | Age: 32
End: 2022-07-27

## 2022-07-27 DIAGNOSIS — N18.30 TYPE 1 DM WITH CKD STAGE 3 AND HYPERTENSION (HCC): Primary | ICD-10-CM

## 2022-07-27 DIAGNOSIS — I12.9 TYPE 1 DM WITH CKD STAGE 3 AND HYPERTENSION (HCC): Primary | ICD-10-CM

## 2022-07-27 DIAGNOSIS — E10.22 TYPE 1 DM WITH CKD STAGE 3 AND HYPERTENSION (HCC): Primary | ICD-10-CM

## 2022-07-27 NOTE — TELEPHONE ENCOUNTER
Pt called to request a referral for endocrinology. She states she has been waiting for her old endocrinologist to get in contact with her and when they finally did they let her know that her doctor is no longer there and that the next available doctor would not be until October. She states she is in need to get her supplies for her insulin pumps because she is in the early stages of kidney disease. She did find a place but they will not schedule her without a referral. She is requesting If we can get that placed asap and give her a call once that has been sent. She wants to go to [facility]. Fax [phone]    No future appointments.

## 2022-08-01 ENCOUNTER — TELEPHONE (OUTPATIENT)
Dept: FAMILY MEDICINE CLINIC | Age: 32
End: 2022-08-01

## 2022-08-01 DIAGNOSIS — M79.641 RIGHT HAND PAIN: Primary | ICD-10-CM

## 2022-08-01 NOTE — TELEPHONE ENCOUNTER
Pt called in regards to getting the name of her referral for her hand. Pt states that she is having the same issue as before. Pt's last referral was from 2019. I spoke with Allison Marquez who spoke with Richard Luu, asking if pt would kate a new referral and if so did she need to schedule an appt to get one. I was instructed to put in an encounter. I told pt that I would send a message to the Dr. I did give her the Dr's name from the referral.  No future appointments.

## 2022-09-27 ENCOUNTER — OFFICE VISIT (OUTPATIENT)
Dept: FAMILY MEDICINE CLINIC | Age: 32
End: 2022-09-27
Payer: MEDICAID

## 2022-09-27 ENCOUNTER — APPOINTMENT (OUTPATIENT)
Dept: FAMILY MEDICINE CLINIC | Age: 32
End: 2022-09-27

## 2022-09-27 VITALS
TEMPERATURE: 98.2 F | SYSTOLIC BLOOD PRESSURE: 146 MMHG | DIASTOLIC BLOOD PRESSURE: 76 MMHG | WEIGHT: 124 LBS | HEART RATE: 109 BPM | BODY MASS INDEX: 21.17 KG/M2 | HEIGHT: 64 IN | OXYGEN SATURATION: 100 % | RESPIRATION RATE: 16 BRPM

## 2022-09-27 DIAGNOSIS — E53.8 FOLATE DEFICIENCY: ICD-10-CM

## 2022-09-27 DIAGNOSIS — E55.9 VITAMIN D DEFICIENCY: ICD-10-CM

## 2022-09-27 DIAGNOSIS — E10.22 TYPE 1 DIABETES MELLITUS WITH STAGE 3 CHRONIC KIDNEY DISEASE, UNSPECIFIED WHETHER STAGE 3A OR 3B CKD (HCC): ICD-10-CM

## 2022-09-27 DIAGNOSIS — N18.30 TYPE 1 DIABETES MELLITUS WITH STAGE 3 CHRONIC KIDNEY DISEASE, UNSPECIFIED WHETHER STAGE 3A OR 3B CKD (HCC): ICD-10-CM

## 2022-09-27 DIAGNOSIS — I10 HTN (HYPERTENSION), BENIGN: Primary | ICD-10-CM

## 2022-09-27 PROCEDURE — 3046F HEMOGLOBIN A1C LEVEL >9.0%: CPT | Performed by: INTERNAL MEDICINE

## 2022-09-27 PROCEDURE — 99214 OFFICE O/P EST MOD 30 MIN: CPT | Performed by: INTERNAL MEDICINE

## 2022-09-27 RX ORDER — AMLODIPINE AND VALSARTAN 10; 160 MG/1; MG/1
1 TABLET ORAL DAILY
Qty: 30 TABLET | Refills: 3 | Status: SHIPPED | OUTPATIENT
Start: 2022-09-27

## 2022-09-27 RX ORDER — ERGOCALCIFEROL 1.25 MG/1
50000 CAPSULE ORAL
Qty: 4 CAPSULE | Refills: 3 | Status: SHIPPED | OUTPATIENT
Start: 2022-09-27

## 2022-09-27 RX ORDER — FOLIC ACID 1 MG/1
1 TABLET ORAL DAILY
Qty: 30 TABLET | Refills: 2 | Status: SHIPPED | OUTPATIENT
Start: 2022-09-27

## 2022-09-27 RX ORDER — HYDROCHLOROTHIAZIDE 12.5 MG/1
12.5 TABLET ORAL
Qty: 90 TABLET | Refills: 0 | Status: SHIPPED | OUTPATIENT
Start: 2022-09-27

## 2022-09-27 NOTE — PROGRESS NOTES
HISTORY OF PRESENT ILLNESS  Deisy Cota is a 29 y.o. female. HPI  HTN, not controlled, her BP is elevated, she is on Exforge daily, need refills  Vit d def, not controlled, she ran out of vit D Rx  Folate def, ? Control, she ran out of folate, need refill  Type 1 DM, followed by Endocrinology, her Endocrinologist retired, need referral to new one, she has insulin pump on, last A1c was 10.5, will check new labs and cc copy to Endo  Followed by Psychiatry for her Bipolar/depression, seen yesterday, will follow up in a month  Review of Systems   Constitutional:  Negative for fever. Respiratory:  Negative for shortness of breath. Cardiovascular:  Negative for chest pain, palpitations and leg swelling. Gastrointestinal:  Negative for abdominal pain and nausea. Physical Exam  Vitals reviewed. Cardiovascular:      Rate and Rhythm: Normal rate and regular rhythm. Heart sounds: Normal heart sounds. No murmur heard. Pulmonary:      Effort: Pulmonary effort is normal.      Breath sounds: Normal breath sounds. Abdominal:      Palpations: Abdomen is soft. Tenderness: There is no abdominal tenderness. Musculoskeletal:      Right lower leg: No edema. Left lower leg: No edema. Neurological:      Mental Status: She is oriented to person, place, and time. Psychiatric:         Attention and Perception: Attention normal.       ASSESSMENT and PLAN  Diagnoses and all orders for this visit:    1. HTN (hypertension), benign, not controlled, continue exforge and add HCTZ  -     amLODIPine-valsartan (EXFORGE)  mg per tablet; Take 1 Tablet by mouth daily. -     hydroCHLOROthiazide (HYDRODIURIL) 12.5 mg tablet; Take 1 Tablet by mouth every morning. 2. Vitamin D deficiency, not controlled, restart vit D weekly Rx  -     VITAMIN D, 25 HYDROXY; Future  -     ergocalciferol (ERGOCALCIFEROL) 1,250 mcg (50,000 unit) capsule; Take 1 Capsule by mouth every seven (7) days. 3. Folate deficiency, ? control  -     folic acid (FOLVITE) 1 mg tablet; Take 1 Tablet by mouth daily.  -     FOLATE; Future    4. Type 1 diabetes mellitus with stage 3 chronic kidney disease, unspecified whether stage 3a or 3b CKD [facility] followed by Endocrinology, will place new referral  -     REFERRAL TO ENDOCRINOLOGY  -     HEMOGLOBIN A1C WITH EAG; Future  -     METABOLIC PANEL, BASIC; Future  -     TSH 3RD GENERATION; Future  -     MICROALBUMIN, UR, RAND W/ MICROALB/CREAT RATIO; Future      Follow-up and Dispositions    Return in about 1 month (around 10/27/2022).

## 2022-09-27 NOTE — PROGRESS NOTES
Scooby Gómez is a 29 y.o. female (: 1990) presenting to address:    Chief Complaint   Patient presents with    Medication Evaluation     Follow up       Vitals:    22 1346   BP: (!) 156/95   Pulse: (!) 109   Resp: 16   Temp: 98.2 °F (36.8 °C)   TempSrc: Temporal   SpO2: 100%   Weight: 124 lb (56.2 kg)   Height: 5' 4\" (1.626 m)       Hearing/Vision:   No results found. Learning Assessment:     Learning Assessment 3/16/2016   PRIMARY LEARNER Patient   HIGHEST LEVEL OF EDUCATION - PRIMARY LEARNER  SOME COLLEGE   BARRIERS PRIMARY LEARNER NONE   CO-LEARNER CAREGIVER No   PRIMARY LANGUAGE ENGLISH   LEARNER PREFERENCE PRIMARY OTHER (COMMENT)   ANSWERED BY patient   RELATIONSHIP SELF     Depression Screening:     3 most recent PHQ Screens 2022   PHQ Not Done -   Little interest or pleasure in doing things Several days   Feeling down, depressed, irritable, or hopeless More than half the days   Total Score PHQ 2 3   Trouble falling or staying asleep, or sleeping too much -   Feeling tired or having little energy -   Poor appetite, weight loss, or overeating -   Feeling bad about yourself - or that you are a failure or have let yourself or your family down -   Trouble concentrating on things such as school, work, reading, or watching TV -   Moving or speaking so slowly that other people could have noticed; or the opposite being so fidgety that others notice -   Thoughts of being better off dead, or hurting yourself in some way -   PHQ 9 Score -   How difficult have these problems made it for you to do your work, take care of your home and get along with others -     Fall Risk Assessment:     Fall Risk Assessment, last 12 mths 2019   Able to walk? Yes   Fall in past 12 months? No     Abuse Screening:     Abuse Screening Questionnaire 2022   Do you ever feel afraid of your partner? N   Are you in a relationship with someone who physically or mentally threatens you?  N   Is it safe for you to go home? Y     ADL Assessment:   No flowsheet data found. Coordination of Care Questionaire:   1. \"Have you been to the ER, urgent care clinic since your last visit? Hospitalized since your last visit? \" No    2. \"Have you seen or consulted any other health care providers outside of the [address] [place] [address] since your last visit? \" No     3. For patients aged 39-70: Has the patient had a colonoscopy? NA - based on age     If the patient is female:    4. For patients aged 41-77: Has the patient had a mammogram within the past 2 years? NA - based on age    11. For patients aged 21-65: Has the patient had a pap smear? No    Advanced Directive:   1. Do you have an Advanced Directive? NO    2. Would you like information on Advanced Directives? NO    Patient DECLINED flu vaccine.

## 2022-09-28 LAB
25(OH)D3 SERPL-MCNC: 18.2 NG/ML (ref 32–100)
ABSOLUTE LYMPHOCYTE COUNT, 10803: 2.5 K/UL (ref 1–4.8)
ANION GAP SERPL CALC-SCNC: 14 MMOL/L (ref 3–15)
AVG GLU, 10930: 320 MG/DL (ref 91–123)
BASOPHILS # BLD: 0.1 K/UL (ref 0–0.2)
BASOPHILS NFR BLD: 1 % (ref 0–2)
BUN SERPL-MCNC: 19 MG/DL (ref 6–22)
CALCIUM SERPL-MCNC: 10 MG/DL (ref 8.4–10.5)
CHLORIDE SERPL-SCNC: 96 MMOL/L (ref 98–110)
CO2 SERPL-SCNC: 20 MMOL/L (ref 20–32)
CREAT SERPL-MCNC: 2.2 MG/DL (ref 0.5–1.2)
EOSINOPHIL # BLD: 0.5 K/UL (ref 0–0.5)
EOSINOPHIL NFR BLD: 7 % (ref 0–6)
ERYTHROCYTE [DISTWIDTH] IN BLOOD BY AUTOMATED COUNT: 13.4 % (ref 10–15.5)
FOLATE,FOL: 6.8 NG/ML
GLOMERULAR FILTRATION RATE: 30 ML/MIN/1.73 SQ.M.
GLUCOSE SERPL-MCNC: 364 MG/DL (ref 70–99)
GRANULOCYTES,GRANS: 52 % (ref 40–75)
HBA1C MFR BLD HPLC: 12.8 % (ref 4.8–5.6)
HCT VFR BLD AUTO: 35.5 % (ref 35.1–46.5)
HGB BLD-MCNC: 10.9 G/DL (ref 11.7–15.5)
LYMPHOCYTES, LYMLT: 33 % (ref 20–45)
MCH RBC QN AUTO: 28 PG (ref 26–34)
MCHC RBC AUTO-ENTMCNC: 31 G/DL (ref 31–36)
MCV RBC AUTO: 92 FL (ref 80–99)
MONOCYTES # BLD: 0.5 K/UL (ref 0.1–1)
MONOCYTES NFR BLD: 7 % (ref 3–12)
NEUTROPHILS # BLD AUTO: 3.9 K/UL (ref 1.8–7.7)
PLATELET # BLD AUTO: 356 K/UL (ref 140–440)
PMV BLD AUTO: 11.6 FL (ref 9–13)
POTASSIUM SERPL-SCNC: 4.7 MMOL/L (ref 3.5–5.5)
RBC # BLD AUTO: 3.85 M/UL (ref 3.8–5.2)
SODIUM SERPL-SCNC: 130 MMOL/L (ref 133–145)
TSH SERPL DL<=0.005 MIU/L-ACNC: 3.95 MCU/ML (ref 0.27–4.2)
WBC # BLD AUTO: 7.4 K/UL (ref 4–11)

## 2022-10-02 NOTE — PROGRESS NOTES
A1c is still elevated @ 12.8, glucose 364, please fax copy to [facility], need to establish with Endo soon for her insulin pump management. Hgb is ok, Folic acid is normal    Vt D is low, please start vit D Rx weekly as told.

## 2022-10-02 NOTE — PROGRESS NOTES
Creatinine/kidney level is stable, @ CKD stage 3b, similar to her levels in May, please fax copy to Nephrology, Dr Juhi Glover

## 2022-10-04 NOTE — PROGRESS NOTES
Patient advised with results. She did state they scheduled her in February, I advised that she call the office and see if they can see her sooner due to her pump.

## 2022-10-12 ENCOUNTER — TELEPHONE (OUTPATIENT)
Dept: FAMILY MEDICINE CLINIC | Age: 32
End: 2022-10-12

## 2023-08-06 PROBLEM — E10.65 UNCONTROLLED TYPE 1 DIABETES MELLITUS WITH HYPERGLYCEMIA, WITH LONG-TERM CURRENT USE OF INSULIN (HCC): Status: ACTIVE | Noted: 2023-08-06

## 2023-08-08 PROBLEM — E10.65 UNCONTROLLED TYPE 1 DIABETES MELLITUS WITH HYPERGLYCEMIA (HCC): Status: ACTIVE | Noted: 2023-08-08

## 2023-08-23 ENCOUNTER — OFFICE VISIT (OUTPATIENT)
Dept: FAMILY MEDICINE CLINIC | Facility: CLINIC | Age: 33
End: 2023-08-23
Payer: MEDICAID

## 2023-08-23 VITALS
BODY MASS INDEX: 23.66 KG/M2 | OXYGEN SATURATION: 100 % | RESPIRATION RATE: 18 BRPM | WEIGHT: 138.6 LBS | DIASTOLIC BLOOD PRESSURE: 77 MMHG | HEIGHT: 64 IN | TEMPERATURE: 97.6 F | HEART RATE: 119 BPM | SYSTOLIC BLOOD PRESSURE: 124 MMHG

## 2023-08-23 DIAGNOSIS — E10.65 UNCONTROLLED TYPE 1 DIABETES MELLITUS WITH HYPERGLYCEMIA, WITH LONG-TERM CURRENT USE OF INSULIN (HCC): ICD-10-CM

## 2023-08-23 DIAGNOSIS — E55.9 VITAMIN D DEFICIENCY, UNSPECIFIED: ICD-10-CM

## 2023-08-23 DIAGNOSIS — Z11.4 ENCOUNTER FOR SCREENING FOR HIV: ICD-10-CM

## 2023-08-23 DIAGNOSIS — D50.9 IRON DEFICIENCY ANEMIA, UNSPECIFIED IRON DEFICIENCY ANEMIA TYPE: ICD-10-CM

## 2023-08-23 DIAGNOSIS — I10 ESSENTIAL (PRIMARY) HYPERTENSION: Primary | ICD-10-CM

## 2023-08-23 DIAGNOSIS — N18.32 CHRONIC KIDNEY DISEASE, STAGE 3B (HCC): ICD-10-CM

## 2023-08-23 LAB — GLUCOSE, POC: 72 MG/DL

## 2023-08-23 PROCEDURE — 3078F DIAST BP <80 MM HG: CPT | Performed by: INTERNAL MEDICINE

## 2023-08-23 PROCEDURE — 82962 GLUCOSE BLOOD TEST: CPT | Performed by: INTERNAL MEDICINE

## 2023-08-23 PROCEDURE — 3074F SYST BP LT 130 MM HG: CPT | Performed by: INTERNAL MEDICINE

## 2023-08-23 PROCEDURE — 3046F HEMOGLOBIN A1C LEVEL >9.0%: CPT | Performed by: INTERNAL MEDICINE

## 2023-08-23 PROCEDURE — 99214 OFFICE O/P EST MOD 30 MIN: CPT | Performed by: INTERNAL MEDICINE

## 2023-08-23 RX ORDER — HYDROCHLOROTHIAZIDE 12.5 MG/1
12.5 TABLET ORAL DAILY
COMMUNITY
Start: 2023-08-18

## 2023-08-23 RX ORDER — AMLODIPINE AND VALSARTAN 10; 160 MG/1; MG/1
1 TABLET ORAL DAILY
COMMUNITY
Start: 2023-08-18

## 2023-08-23 RX ORDER — BLOOD SUGAR DIAGNOSTIC
STRIP MISCELLANEOUS
COMMUNITY
Start: 2023-08-14

## 2023-08-23 RX ORDER — SYRINGE-NEEDLE,INSULIN,0.5 ML 31 GX5/16"
SYRINGE, EMPTY DISPOSABLE MISCELLANEOUS
COMMUNITY
Start: 2023-08-16

## 2023-08-23 RX ORDER — ERGOCALCIFEROL 1.25 MG/1
50000 CAPSULE ORAL
Qty: 4 CAPSULE | Refills: 3 | Status: SHIPPED | OUTPATIENT
Start: 2023-08-23

## 2023-08-23 RX ORDER — BUSPIRONE HYDROCHLORIDE 30 MG/1
30 TABLET ORAL 2 TIMES DAILY
COMMUNITY
Start: 2023-08-21

## 2023-08-23 RX ORDER — INSULIN LISPRO 100 [IU]/ML
INJECTION, SOLUTION INTRAVENOUS; SUBCUTANEOUS
COMMUNITY
Start: 2023-07-31

## 2023-08-23 ASSESSMENT — ENCOUNTER SYMPTOMS
SHORTNESS OF BREATH: 0
COUGH: 0
ABDOMINAL PAIN: 0

## 2023-08-23 NOTE — PROGRESS NOTES
HISTORY OF PRESENT ILLNESS  Mike Silvestre is a 34 y.o. female    HPI    Pt was recently in the hospital for URI/Dehydration/LINDA from 8-6 until 8-14-23, the hospital records and d/c summary noted today  HTN, stable, bp is controlled, she is on exforge/hctz and lasix daily now  Vit d def, not controlled, she has not been taking any vit D, she was restarted on vit D weekly upon discharge, her last level was 18.2  CKD, stage 3b, worsening, her GFR was 23 and creat 2.99 on discharge, which is improvement from 18/3.83 on admission, she had LINDA on admission 2/2 her illness and dehydration  Iron def anemia, improved, she was transfused with PRBC's, her Hgb improved to 8.0 on discharge, she does not tolerate po iron, she was getting monthly iron infusions. DM, type 1, not controlled, she is on insulin pump, recent A1c was 13.9, she was recently seen by her Endocrinologist, she will have a new pump placed soon and also CGM monitor, she did not have lunch today and her glucose was 64 on arrival, she was given glucose tablet and repeat glucose was 72. Review of Systems   Constitutional:  Negative for fever. Respiratory:  Negative for cough and shortness of breath. Cardiovascular:  Negative for chest pain, palpitations and leg swelling. Gastrointestinal:  Negative for abdominal pain. Physical Exam  Vitals reviewed. Cardiovascular:      Rate and Rhythm: Normal rate and regular rhythm. Heart sounds: Normal heart sounds. No murmur heard. Pulmonary:      Effort: Pulmonary effort is normal.      Breath sounds: Normal breath sounds. No rales. Abdominal:      Palpations: Abdomen is soft. Tenderness: There is no abdominal tenderness. Musculoskeletal:      Right lower leg: No edema. Left lower leg: No edema. Neurological:      Mental Status: She is oriented to person, place, and time. ASSESSMENT and PLAN    1.  Essential (primary) hypertension, stable, continue exforge/lasix/hctz @ current

## 2023-10-23 SDOH — ECONOMIC STABILITY: FOOD INSECURITY: WITHIN THE PAST 12 MONTHS, YOU WORRIED THAT YOUR FOOD WOULD RUN OUT BEFORE YOU GOT MONEY TO BUY MORE.: OFTEN TRUE

## 2023-10-23 SDOH — ECONOMIC STABILITY: HOUSING INSECURITY
IN THE LAST 12 MONTHS, WAS THERE A TIME WHEN YOU DID NOT HAVE A STEADY PLACE TO SLEEP OR SLEPT IN A SHELTER (INCLUDING NOW)?: YES

## 2023-10-23 SDOH — ECONOMIC STABILITY: FOOD INSECURITY: WITHIN THE PAST 12 MONTHS, THE FOOD YOU BOUGHT JUST DIDN'T LAST AND YOU DIDN'T HAVE MONEY TO GET MORE.: OFTEN TRUE

## 2023-10-23 SDOH — ECONOMIC STABILITY: TRANSPORTATION INSECURITY
IN THE PAST 12 MONTHS, HAS LACK OF TRANSPORTATION KEPT YOU FROM MEETINGS, WORK, OR FROM GETTING THINGS NEEDED FOR DAILY LIVING?: YES

## 2023-10-23 SDOH — ECONOMIC STABILITY: INCOME INSECURITY: HOW HARD IS IT FOR YOU TO PAY FOR THE VERY BASICS LIKE FOOD, HOUSING, MEDICAL CARE, AND HEATING?: VERY HARD

## 2023-10-24 ENCOUNTER — OFFICE VISIT (OUTPATIENT)
Dept: FAMILY MEDICINE CLINIC | Facility: CLINIC | Age: 33
End: 2023-10-24
Payer: MEDICAID

## 2023-10-24 VITALS
HEART RATE: 90 BPM | OXYGEN SATURATION: 100 % | WEIGHT: 126.8 LBS | DIASTOLIC BLOOD PRESSURE: 86 MMHG | TEMPERATURE: 97.9 F | BODY MASS INDEX: 21.65 KG/M2 | HEIGHT: 64 IN | RESPIRATION RATE: 16 BRPM | SYSTOLIC BLOOD PRESSURE: 136 MMHG

## 2023-10-24 DIAGNOSIS — M54.2 NECK PAIN: Primary | ICD-10-CM

## 2023-10-24 DIAGNOSIS — M54.50 ACUTE LEFT-SIDED LOW BACK PAIN WITHOUT SCIATICA: ICD-10-CM

## 2023-10-24 PROCEDURE — 99214 OFFICE O/P EST MOD 30 MIN: CPT | Performed by: INTERNAL MEDICINE

## 2023-10-24 RX ORDER — METHOCARBAMOL 750 MG/1
750 TABLET, FILM COATED ORAL
COMMUNITY
End: 2023-10-24 | Stop reason: SDUPTHER

## 2023-10-24 RX ORDER — LIDOCAINE 50 MG/G
1 PATCH TOPICAL DAILY
Qty: 30 PATCH | Refills: 0 | Status: SHIPPED | OUTPATIENT
Start: 2023-10-24

## 2023-10-24 RX ORDER — OXYCODONE HYDROCHLORIDE AND ACETAMINOPHEN 5; 325 MG/1; MG/1
1 TABLET ORAL PRN
COMMUNITY

## 2023-10-24 RX ORDER — METHOCARBAMOL 750 MG/1
750 TABLET, FILM COATED ORAL 4 TIMES DAILY PRN
Qty: 50 TABLET | Refills: 0 | Status: SHIPPED | OUTPATIENT
Start: 2023-10-24

## 2023-10-24 ASSESSMENT — ENCOUNTER SYMPTOMS
SHORTNESS OF BREATH: 0
ABDOMINAL PAIN: 0
EYE PAIN: 0
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
## 2023-10-25 ENCOUNTER — TELEPHONE (OUTPATIENT)
Dept: FAMILY MEDICINE CLINIC | Facility: CLINIC | Age: 33
End: 2023-10-25

## 2023-10-25 NOTE — TELEPHONE ENCOUNTER
[facility] called to advise they received referral but do not accept Medicaid or Medicaid products; pt has [place] Medicaid. Message sent to clinical staff so they are aware.

## 2023-10-26 ENCOUNTER — HOSPITAL ENCOUNTER (OUTPATIENT)
Facility: HOSPITAL | Age: 33
Setting detail: RECURRING SERIES
Discharge: HOME | End: 2023-10-29
Payer: MEDICAID

## 2023-10-26 PROCEDURE — 97162 PT EVAL MOD COMPLEX 30 MIN: CPT
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
## 2023-10-26 NOTE — PROGRESS NOTES
PHYSICAL / OCCUPATIONAL THERAPY - DAILY TREATMENT NOTE (updated )  For Eval visit    Patient Name: Lb Tyson    Date: 10/26/2023    : 1990  Insurance: Payor: Carole Granados / Plan: Carole Granados / Product Type: *No Product type* /      Patient  verified yes     Visit #   Current / Total 1 12   Time   In / Out 215 250   Pain   In / Out 8/10 8/10   Subjective Functional Status/Changes: See POC     TREATMENT AREA =  see POC    OBJECTIVE           35 min   Eval - untimed                      Therapeutic Procedures: Tx Min Billable or 1:1 Min (if diff from [employer]) Procedure, Rationale, Specifics          Details if applicable:              Details if applicable:            Details if applicable:            Details if applicable:            Details if applicable:       [facility] Totals Reminder: bill using total billable min of TIMED therapeutic procedures (example: do not include dry needle or estim unattended, both untimed codes, in totals to left)  8-22 min = 1 unit; 23-37 min = 2 units; 38-52 min = 3 units; 53-67 min = 4 units; 68-82 min = 5 units   Total Total     [x]  Patient Education billed concurrently with other procedures   [x] Review HEP    [] Progressed/Changed HEP, detail:    [] Other detail:       Objective Information/Functional Measures/Assessment    See POC    Patient will continue to benefit from skilled PT / OT services to modify and progress therapeutic interventions, analyze and address functional mobility deficits, analyze and address ROM deficits, analyze and address strength deficits, analyze and address soft tissue restrictions, analyze and cue for proper movement patterns, and analyze and modify for postural abnormalities to address functional deficits and attain remaining goals.     Progress toward goals / Updated goals:  [x]  See POC      PLAN  yes Continue plan of care  []  Other:      Eloisa Perez, PT    10/26/2023    3:59 PM    Future Appointments   Date Time

## 2023-11-01 ENCOUNTER — HOSPITAL ENCOUNTER (OUTPATIENT)
Facility: HOSPITAL | Age: 33
Setting detail: RECURRING SERIES
Discharge: HOME | End: 2023-11-04
Payer: MEDICAID

## 2023-11-01 PROCEDURE — 97110 THERAPEUTIC EXERCISES: CPT

## 2023-11-01 PROCEDURE — 97530 THERAPEUTIC ACTIVITIES: CPT

## 2023-11-01 PROCEDURE — 97112 NEUROMUSCULAR REEDUCATION: CPT

## 2023-11-01 NOTE — PROGRESS NOTES
remaining functional goals. (see flow sheet as applicable)     Details if applicable:       9  38386 Neuromuscular Re-Education (timed):  improve balance, coordination, kinesthetic sense, posture, core stability and proprioception to improve patient's ability to develop conscious control of individual muscles and awareness of position of extremities in order to progress to PLOF and address remaining functional goals. (see flow sheet as applicable)     Details if applicable:     20  72474 Therapeutic Activity (timed):  use of dynamic activities replicating functional movements to increase ROM, strength, coordination, balance, and proprioception in order to improve patient's ability to progress to PLOF and address remaining functional goals. (see flow sheet as applicable)     Details if applicable:            Details if applicable:            Details if applicable:     52  [facility] Totals Reminder: bill using total billable min of TIMED therapeutic procedures (example: do not include dry needle or estim unattended, both untimed codes, in totals to left)  8-22 min = 1 unit; 23-37 min = 2 units; 38-52 min = 3 units; 53-67 min = 4 units; 68-82 min = 5 units   Total Total     [x]  Patient Education billed concurrently with other procedures   [] Review HEP    [] Progressed/Changed HEP, detail:    [] Other detail:       Objective Information/Functional Measures/Assessment  Pt presented with fair tolerance of all new therex with chief c/o increased bilateral cervical and upper trap and L>R lumbar quadrant pain/symptoms. Pt presented with increased pain behavior with various exercises as well as slow/guarded mobility with bed mobility during treatment today. Pt needs the most focus on cervical and lumbar mobility, strengthening as well as cervicothoracic and lumbopelvic/core stabilization. Will continue to progress/advance within current POC with monitoring symptoms as tolerated.       Patient will continue to benefit from

## 2023-11-03 ENCOUNTER — HOSPITAL ENCOUNTER (OUTPATIENT)
Facility: HOSPITAL | Age: 33
Setting detail: RECURRING SERIES
Discharge: HOME | End: 2023-11-06
Payer: MEDICAID

## 2023-11-03 PROCEDURE — 97112 NEUROMUSCULAR REEDUCATION: CPT

## 2023-11-03 PROCEDURE — 97110 THERAPEUTIC EXERCISES: CPT

## 2023-11-03 PROCEDURE — 97530 THERAPEUTIC ACTIVITIES: CPT

## 2023-11-03 NOTE — PROGRESS NOTES
PHYSICAL / OCCUPATIONAL THERAPY - DAILY TREATMENT NOTE (updated )    Patient Name: Jose A Necessary    Date: 11/3/2023    : 1990  Insurance: Payor: Ronald Morris / Plan: Ronald Morris / Product Type: *No Product type* /      Patient  verified Yes     Visit #   Current / Total 3 12   Time   In / Out 2:26 3:20   Pain   In / Out 7/10 5/10   Subjective Functional Status/Changes: My neck and back was hurting and more tight yesterday after I was here on Wednesday, but I actually feel a little bit better today than before I was here last, so I think that it helped. TREATMENT AREA =  Cervicalgia [M54.2]  Other low back pain [M54.59]    OBJECTIVE    Modalities Rationale:     decrease pain and increase tissue extensibility to improve patient's ability to progress to PLOF and address remaining functional goals. min [] Estim Unattended, type/location:                                      []  w/ice    []  w/heat    min [] Estim Attended, type/location:                                     []  w/US     []  w/ice    []  w/heat    []  TENS insruct      min []  Mechanical Traction: type/lbs                   []  pro   []  sup   []  int   []  cont    []  before manual    []  after manual    min []  Ultrasound, settings/location:     10 min  unbill [x]  Ice     []  Heat    location/position: Cervical/lumbar in reclined position    min []  Paraffin,  details:     min []  Vasopneumatic Device, press/temp:     min []  Eldonna Flight / Reola Nunez: If using vaso (only need to measure limb vaso being performed on)      pre-treatment girth :       post-treatment girth :       measured at (landmark location) :      min []  Other:    Skin assessment post-treatment:   Redness, no adverse reactions      Therapeutic Procedures:     Tx Min Billable or 1:1 Min (if diff from Tx Min) Procedure, Rationale, Specifics   18  77762 Therapeutic Exercise (timed):  increase ROM, strength, coordination, balance, and proprioception to

## 2023-11-08 ENCOUNTER — HOSPITAL ENCOUNTER (OUTPATIENT)
Facility: HOSPITAL | Age: 33
Setting detail: RECURRING SERIES
Discharge: HOME | End: 2023-11-11
Payer: MEDICAID

## 2023-11-08 PROCEDURE — 97112 NEUROMUSCULAR REEDUCATION: CPT

## 2023-11-08 PROCEDURE — 97110 THERAPEUTIC EXERCISES: CPT

## 2023-11-08 PROCEDURE — 97530 THERAPEUTIC ACTIVITIES: CPT

## 2023-11-08 NOTE — PROGRESS NOTES
PHYSICAL / OCCUPATIONAL THERAPY - DAILY TREATMENT NOTE (updated )    Patient Name: Deanna Sidhu    Date: 2023    : 1990  Insurance: Payor: Adiel Zamarripa / Plan: Adiel Zamarripa / Product Type: *No Product type* /      Patient  verified Yes     Visit #   Current / Total 4 12   Time   In / Out 222 312   Pain   In / Out 7/10 5/10   Subjective Functional Status/Changes: Pt reports no change in neck and back pain since last visit. States exercises have been helping. TREATMENT AREA =  Cervicalgia [M54.2]  Other low back pain [M54.59]    OBJECTIVE    Modalities Rationale:     decrease pain and increase tissue extensibility to improve patient's ability to progress to PLOF and address remaining functional goals. min [] Estim Unattended, type/location:                                      []  w/ice    []  w/heat    min [] Estim Attended, type/location:                                     []  w/US     []  w/ice    []  w/heat    []  TENS insruct      min []  Mechanical Traction: type/lbs                   []  pro   []  sup   []  int   []  cont    []  before manual    []  after manual    min []  Ultrasound, settings/location:     10 min  unbill []  Ice     [x]  Heat    location/position: Cervical/lumbar in reclined position    min []  Paraffin,  details:     min []  Vasopneumatic Device, press/temp:     min []  Carvin Loots / Walls Been: If using vaso (only need to measure limb vaso being performed on)      pre-treatment girth :       post-treatment girth :       measured at (landmark location) :      min []  Other:    Skin assessment post-treatment:   Intact      Therapeutic Procedures: Tx Min Billable or 1:1 Min (if diff from Tx Min) Procedure, Rationale, Specifics   20  97875 Therapeutic Exercise (timed):  increase ROM, strength, coordination, balance, and proprioception to improve patient's ability to progress to PLOF and address remaining functional goals.  (see flow sheet as applicable)

## 2023-11-10 ENCOUNTER — HOSPITAL ENCOUNTER (OUTPATIENT)
Facility: HOSPITAL | Age: 33
Setting detail: RECURRING SERIES
End: 2023-11-10
Payer: MEDICAID

## 2023-11-17 ENCOUNTER — HOSPITAL ENCOUNTER (OUTPATIENT)
Facility: HOSPITAL | Age: 33
Setting detail: RECURRING SERIES
Discharge: HOME | End: 2023-11-20
Payer: MEDICAID

## 2023-11-17 PROCEDURE — 97530 THERAPEUTIC ACTIVITIES: CPT

## 2023-11-17 PROCEDURE — 97112 NEUROMUSCULAR REEDUCATION: CPT

## 2023-11-17 PROCEDURE — 97110 THERAPEUTIC EXERCISES: CPT

## 2023-11-17 NOTE — PROGRESS NOTES
sheet as applicable)     Details if applicable:       10  28191 Neuromuscular Re-Education (timed):  improve balance, coordination, kinesthetic sense, posture, core stability and proprioception to improve patient's ability to develop conscious control of individual muscles and awareness of position of extremities in order to progress to PLOF and address remaining functional goals. (see flow sheet as applicable)     Details if applicable:     16  03983 Therapeutic Activity (timed):  use of dynamic activities replicating functional movements to increase ROM, strength, coordination, balance, and proprioception in order to improve patient's ability to progress to PLOF and address remaining functional goals. (see flow sheet as applicable)     Details if applicable:            Details if applicable:            Details if applicable:     39  [facility] Totals Reminder: bill using total billable min of TIMED therapeutic procedures (example: do not include dry needle or estim unattended, both untimed codes, in totals to left)  8-22 min = 1 unit; 23-37 min = 2 units; 38-52 min = 3 units; 53-67 min = 4 units; 68-82 min = 5 units   Total Total     [x]  Patient Education billed concurrently with other procedures   [x] Review HEP    [] Progressed/Changed HEP, detail:    [] Other detail:       Objective Information/Functional Measures/Assessment    Started session with UBE for warm up followed by UE stretching and strengthening. Patient had mild c/o soreness/fatigue during strengthening drills. Decrease in sx post t/s, l/s mobility exercises. Continue to progress as tolerated and per POC    Patient will continue to benefit from skilled PT / OT services to modify and progress therapeutic interventions, analyze and address functional mobility deficits, analyze and address ROM deficits, and analyze and address strength deficits to address functional deficits and attain remaining goals.     Progress toward goals / Updated goals:  []

## 2023-11-22 ENCOUNTER — APPOINTMENT (OUTPATIENT)
Facility: HOSPITAL | Age: 33
End: 2023-11-22
Payer: MEDICAID

## 2023-11-28 ENCOUNTER — OFFICE VISIT (OUTPATIENT)
Dept: FAMILY MEDICINE CLINIC | Facility: CLINIC | Age: 33
End: 2023-11-28
Payer: MEDICAID

## 2023-11-28 VITALS
TEMPERATURE: 98.2 F | DIASTOLIC BLOOD PRESSURE: 76 MMHG | HEART RATE: 100 BPM | BODY MASS INDEX: 22.64 KG/M2 | WEIGHT: 132.6 LBS | HEIGHT: 64 IN | SYSTOLIC BLOOD PRESSURE: 148 MMHG | RESPIRATION RATE: 18 BRPM | OXYGEN SATURATION: 100 %

## 2023-11-28 DIAGNOSIS — I10 ESSENTIAL (PRIMARY) HYPERTENSION: ICD-10-CM

## 2023-11-28 DIAGNOSIS — E55.9 VITAMIN D DEFICIENCY, UNSPECIFIED: Primary | ICD-10-CM

## 2023-11-28 PROCEDURE — 3077F SYST BP >= 140 MM HG: CPT | Performed by: INTERNAL MEDICINE

## 2023-11-28 PROCEDURE — 3078F DIAST BP <80 MM HG: CPT | Performed by: INTERNAL MEDICINE

## 2023-11-28 PROCEDURE — 99214 OFFICE O/P EST MOD 30 MIN: CPT | Performed by: INTERNAL MEDICINE

## 2023-11-28 RX ORDER — ERGOCALCIFEROL 1.25 MG/1
50000 CAPSULE ORAL
Qty: 4 CAPSULE | Refills: 3 | Status: SHIPPED | OUTPATIENT
Start: 2023-11-28

## 2023-11-28 RX ORDER — HYDROCHLOROTHIAZIDE 25 MG/1
25 TABLET ORAL EVERY MORNING
Qty: 90 TABLET | Refills: 1 | Status: SHIPPED | OUTPATIENT
Start: 2023-11-28

## 2023-11-28 ASSESSMENT — ENCOUNTER SYMPTOMS: COUGH: 0

## 2023-11-28 NOTE — PROGRESS NOTES
HISTORY OF PRESENT ILLNESS  Arianna Heredia is a 34 y.o. female    HPI  Vit d def, improving, she has been taking her vit D weekly, need refills  HTN, not controlled, her bp is elevated, she is on exforge and hctz daily    Review of Systems   Respiratory:  Negative for cough. Cardiovascular:  Negative for chest pain. Musculoskeletal:  Negative for myalgias. Neurological:  Negative for headaches. Physical Exam  Vitals reviewed. Cardiovascular:      Rate and Rhythm: Normal rate and regular rhythm. Heart sounds: No murmur heard. Pulmonary:      Effort: Pulmonary effort is normal.      Breath sounds: Normal breath sounds. No rales. Musculoskeletal:      Right lower leg: No edema. Left lower leg: No edema. ASSESSMENT and PLAN    1. Vitamin D deficiency, unspecified, improving  -     ergocalciferol (ERGOCALCIFEROL) 1.25 MG (54666 UT) capsule; Take 1 capsule by mouth every 7 days, Disp-4 capsule, R-3Normal  -     Vitamin D 25 Hydroxy; Future  2. Essential (primary) hypertension, not controlled, continue Exforge 10/160 mg daily, and will increase hctz dose  -     hydroCHLOROthiazide (HYDRODIURIL) 25 MG tablet; Take 1 tablet by mouth every morning, Disp-90 tablet, R-1Normal         Return in about 5 weeks (around 1/5/2024).

## 2023-11-29 ENCOUNTER — HOSPITAL ENCOUNTER (OUTPATIENT)
Facility: HOSPITAL | Age: 33
Setting detail: RECURRING SERIES
Discharge: HOME | End: 2023-12-02
Payer: MEDICAID

## 2023-11-29 PROCEDURE — 97530 THERAPEUTIC ACTIVITIES: CPT

## 2023-11-29 PROCEDURE — 97535 SELF CARE MNGMENT TRAINING: CPT

## 2023-11-29 PROCEDURE — 97110 THERAPEUTIC EXERCISES: CPT

## 2023-11-29 NOTE — PROGRESS NOTES
PHYSICAL / OCCUPATIONAL THERAPY - DAILY TREATMENT NOTE (updated )    Patient Name: Jerome Weir    Date: 2023    : 1990  Insurance: Payor: Angela Mariscal / Plan: Angela Mariscal / Product Type: *No Product type* /      Patient  verified Yes     Visit #   Current / Total 6 12   Time   In / Out 223 302   Pain   In / Out 6-back  8-neck/shoulder 6-back  8-neck/shoulder   Subjective Functional Status/Changes: Pt reports pain and popping in her right shoulder that started 4 days ago. States she recently started school again and has been doing a lot of sitting lately. TREATMENT AREA =  Cervicalgia [M54.2]  Other low back pain [M54.59]    OBJECTIVE         Therapeutic Procedures: Tx Min Billable or 1:1 Min (if diff from Tx Min) Procedure, Rationale, Specifics   10  03727 Therapeutic Exercise (timed):  increase ROM, strength, coordination, balance, and proprioception to improve patient's ability to progress to PLOF and address remaining functional goals. (see flow sheet as applicable)     Details if applicable:       21  67127 Therapeutic Activity (timed):  use of dynamic activities replicating functional movements to increase ROM, strength, coordination, balance, and proprioception in order to improve patient's ability to progress to PLOF and address remaining functional goals. (see flow sheet as applicable)     Details if applicable:       14103 Neuromuscular Re-Education (timed):  improve balance, coordination, kinesthetic sense, posture, core stability and proprioception to improve patient's ability to develop conscious control of individual muscles and awareness of position of extremities in order to progress to PLOF and address remaining functional goals.  (see flow sheet as applicable)     Details if applicable:     8  80148 Self Care/Home Management (timed):  improve patient knowledge and understanding of pain reducing techniques, positioning, posture/ergonomics, and activity
life.   PN: Not assessed   4. Patient will demonstrate full, pain free cervical AROM into all planes in order to improve tolerance to recreational activities,   PN: flex 62 (P!), ext 24,R SB 20 (P!), L SB 28 (P!), RR 40 (P!), LR 45 (P!).   5. Patient will demonstrate full, pain free lumbar spine AROM into all planes in order to improve tolerance to work activities. PN: Lumbar Spine AROM: flex hands to shins (P!), ext 30%, RR 50% (P!), LR 50% (P!), RSB knee (P!), LSB knee (P!)    Frequency / Duration:   Patient to be seen   2   times per week for   4    weeks:    RECOMMENDATIONS  We would like to continue therapy for progress to goals stated above. Continue per initial Plan of Care. If you have any questions/comments please contact us directly. Thank you for allowing us to assist in the care of your patient. Be Alanizrenata, PTA       11/29/2023       6:59 PM  ========================================================================================    ___ I have read the above report and request that my patient continue as recommended.   ___ I have read the above report and request that my patient continue therapy with the following changes/special instructions: ________________________________________________   ___ I have read the above report and request that my patient be discharged from therapy. Physician's Signature:_________________________   DATE:_________   TIME:________                           Andressa Ramesh MD    ** Signature, Date and Time must be completed for valid certification **  [facility].   Thank you

## 2023-12-06 ENCOUNTER — HOSPITAL ENCOUNTER (OUTPATIENT)
Facility: HOSPITAL | Age: 33
Setting detail: RECURRING SERIES
Discharge: HOME | End: 2023-12-09
Payer: MEDICAID

## 2023-12-06 PROCEDURE — 97530 THERAPEUTIC ACTIVITIES: CPT

## 2023-12-06 PROCEDURE — 97110 THERAPEUTIC EXERCISES: CPT

## 2023-12-06 PROCEDURE — 97112 NEUROMUSCULAR REEDUCATION: CPT

## 2023-12-06 NOTE — PROGRESS NOTES
PHYSICAL / OCCUPATIONAL THERAPY - DAILY TREATMENT NOTE (updated )    Patient Name: Mike Rough    Date: 2023    : 1990  Insurance: Payor: Carlos Catalan / Plan: Carlos Catalan / Product Type: *No Product type* /      Patient  verified Yes     Visit #   Current / Total 7 14   Time   In / Out 227 305   Pain   In / Out 4 0   Subjective Functional Status/Changes: Pt states neck and back feel better today and reports decreased pain and discomfort. TREATMENT AREA =  Cervicalgia [M54.2]  Other low back pain [M54.59]    OBJECTIVE         Therapeutic Procedures: Tx Min Billable or 1:1 Min (if diff from Tx Min) Procedure, Rationale, Specifics   20  40667 Therapeutic Exercise (timed):  increase ROM, strength, coordination, balance, and proprioception to improve patient's ability to progress to PLOF and address remaining functional goals. (see flow sheet as applicable)     Details if applicable:       8  22907 Therapeutic Activity (timed):  use of dynamic activities replicating functional movements to increase ROM, strength, coordination, balance, and proprioception in order to improve patient's ability to progress to PLOF and address remaining functional goals. (see flow sheet as applicable)     Details if applicable:     10  79179 Neuromuscular Re-Education (timed):  improve balance, coordination, kinesthetic sense, posture, core stability and proprioception to improve patient's ability to develop conscious control of individual muscles and awareness of position of extremities in order to progress to PLOF and address remaining functional goals.  (see flow sheet as applicable)     Details if applicable:            Details if applicable:            Details if applicable:     45  [facility] Totals Reminder: bill using total billable min of TIMED therapeutic procedures (example: do not include dry needle or estim unattended, both untimed codes, in totals to left)  8-22 min = 1 unit; 23-37 min = 2

## 2023-12-08 ENCOUNTER — HOSPITAL ENCOUNTER (OUTPATIENT)
Facility: HOSPITAL | Age: 33
Setting detail: RECURRING SERIES
Discharge: HOME | End: 2023-12-11
Payer: MEDICAID

## 2023-12-08 PROCEDURE — 97530 THERAPEUTIC ACTIVITIES: CPT

## 2023-12-08 PROCEDURE — 97112 NEUROMUSCULAR REEDUCATION: CPT

## 2023-12-08 PROCEDURE — 97110 THERAPEUTIC EXERCISES: CPT

## 2023-12-08 NOTE — PROGRESS NOTES
pain free lumbar spine AROM into all planes in order to improve tolerance to work activities.    PN: Lumbar Spine AROM: flex hands to shins (P!), ext 30%, RR 50% (P!), LR 50% (P!), RSB knee (P!), LSB knee (P!)    Next PN/ RC due 12/29/23  Auth due 12/31/23    PLAN  - Continue Plan of [facility], [facility]    12/8/2023    10:43 AM    Future Appointments   Date Time Provider [address]   12/8/2023  1:40 PM Genette Doyle [facility]   12/13/2023  2:20 PM Akira Silverman [facility]   12/15/2023  2:20 PM Genette Doyle [facility] [address] [place] [address]   12/20/2023  2:20 PM Genette Doyle [facility]   12/22/2023  2:20 PM Juany Tinsley PT [facility] [address] [place] [address]   12/27/2023  1:40 PM Juany Tinsley PT [facility] [address] [place] [address]   1/2/2024  4:00 PM Douglas Solis MD [facility]

## 2023-12-13 ENCOUNTER — HOSPITAL ENCOUNTER (OUTPATIENT)
Facility: HOSPITAL | Age: 33
Setting detail: RECURRING SERIES
End: 2023-12-13
Payer: MEDICAID

## 2023-12-15 ENCOUNTER — TELEPHONE (OUTPATIENT)
Facility: HOSPITAL | Age: 33
End: 2023-12-15

## 2023-12-22 ENCOUNTER — HOSPITAL ENCOUNTER (OUTPATIENT)
Facility: HOSPITAL | Age: 33
Setting detail: RECURRING SERIES
End: 2023-12-22
Payer: MEDICAID

## 2023-12-27 ENCOUNTER — APPOINTMENT (OUTPATIENT)
Facility: HOSPITAL | Age: 33
End: 2023-12-27
Payer: MEDICAID

## 2024-02-16 LAB — VITAMIN D 25-HYDROXY: 16.5 NG/ML (ref 32–100)

## 2024-02-20 DIAGNOSIS — E55.9 VITAMIN D DEFICIENCY, UNSPECIFIED: ICD-10-CM

## 2024-02-20 RX ORDER — ERGOCALCIFEROL 1.25 MG/1
50000 CAPSULE ORAL
Qty: 4 CAPSULE | Refills: 3 | Status: SHIPPED | OUTPATIENT
Start: 2024-02-20

## 2024-06-08 PROBLEM — E10.10 DKA, TYPE 1, NOT AT GOAL (HCC): Status: ACTIVE | Noted: 2024-06-08

## 2024-06-19 LAB — HBA1C MFR BLD HPLC: 10.3 %

## 2024-06-25 ENCOUNTER — OFFICE VISIT (OUTPATIENT)
Dept: FAMILY MEDICINE CLINIC | Facility: CLINIC | Age: 34
End: 2024-06-25

## 2024-06-25 VITALS
DIASTOLIC BLOOD PRESSURE: 85 MMHG | SYSTOLIC BLOOD PRESSURE: 158 MMHG | TEMPERATURE: 97.7 F | HEART RATE: 103 BPM | HEIGHT: 64 IN | RESPIRATION RATE: 16 BRPM | BODY MASS INDEX: 24.92 KG/M2 | WEIGHT: 146 LBS | OXYGEN SATURATION: 98 %

## 2024-06-25 DIAGNOSIS — Z99.2 ESRD ON HEMODIALYSIS (HCC): ICD-10-CM

## 2024-06-25 DIAGNOSIS — N18.6 ESRD ON HEMODIALYSIS (HCC): ICD-10-CM

## 2024-06-25 DIAGNOSIS — L73.2 HIDRADENITIS SUPPURATIVA: ICD-10-CM

## 2024-06-25 DIAGNOSIS — I10 ESSENTIAL (PRIMARY) HYPERTENSION: ICD-10-CM

## 2024-06-25 DIAGNOSIS — Z09 HOSPITAL DISCHARGE FOLLOW-UP: Primary | ICD-10-CM

## 2024-06-25 DIAGNOSIS — E55.9 VITAMIN D DEFICIENCY, UNSPECIFIED: ICD-10-CM

## 2024-06-25 RX ORDER — OXYCODONE HYDROCHLORIDE 5 MG/1
5 TABLET ORAL EVERY 6 HOURS PRN
COMMUNITY
Start: 2024-06-22

## 2024-06-25 RX ORDER — HYDRALAZINE HYDROCHLORIDE 100 MG/1
100 TABLET, FILM COATED ORAL 3 TIMES DAILY
Qty: 270 TABLET | Refills: 3 | Status: SHIPPED | OUTPATIENT
Start: 2024-06-25

## 2024-06-25 RX ORDER — ERGOCALCIFEROL 1.25 MG/1
50000 CAPSULE ORAL
Qty: 12 CAPSULE | Refills: 2 | Status: SHIPPED | OUTPATIENT
Start: 2024-06-25

## 2024-06-25 ASSESSMENT — ENCOUNTER SYMPTOMS
SHORTNESS OF BREATH: 0
COUGH: 0

## 2024-06-25 NOTE — PROGRESS NOTES
Angely Luna is a 34 y.o. female (: 1990) presenting to address:    Chief Complaint   Patient presents with    Follow-Up from Hospital       Vitals:    24 0941   BP: (!) 158/85   Pulse: (!) 103   Resp: 16   Temp: 97.7 °F (36.5 °C)   SpO2: 98%       \"Have you been to the ER, urgent care clinic since your last visit?  Hospitalized since your last visit?\"    YES - When: approximately 1  weeks ago.  Where and Why: [facility].    “Have you seen or consulted any other health care providers outside of [facility] since your last visit?”    NO        “Have you had a pap smear?”    NO    No cervical cancer screening on file

## 2024-06-25 NOTE — PROGRESS NOTES
HISTORY OF PRESENT ILLNESS  Angely Luna is a 34 y.o. female    HPI  Patient was recently in the hospital, discharged 3 days ago, medication reconciliation done today, hospital records reviewed today, patient was in the hospital for renal failure and she was started on hemodialysis in the hospital and she will start outpatient hemodialysis later today, she will also follow-up with vascular surgeon later on to start the process of AV shunt for dialysis.  Hypertension not controlled, her blood pressure is elevated and has been elevated in the hospital, she is on hydralazine and Norvasc daily.  Vitamin D deficiency, improving, her recent vitamin D level was still low at 25.2 but better than before, she is on vitamin D 50,000 unit weekly, needs refill.  Patient stated that she gets recurrent infections in both axillary areas off and on, the left is worse than the right, she was recently treated with antibiotics and improved but she still have the tracks and sometimes clear drainage, she wants to see dermatology.    Review of Systems   Respiratory:  Negative for cough and shortness of breath.    Cardiovascular:  Negative for chest pain and leg swelling.   Neurological:  Negative for dizziness and headaches.         Physical Exam  Vitals reviewed.   Cardiovascular:      Rate and Rhythm: Normal rate and regular rhythm.      Heart sounds: No murmur heard.  Pulmonary:      Effort: Pulmonary effort is normal.      Breath sounds: Normal breath sounds. No rales.   Abdominal:      Palpations: Abdomen is soft.      Tenderness: There is no abdominal tenderness.   Musculoskeletal:      Right lower leg: No edema.      Left lower leg: No edema.   Skin:     Comments: Multiple bilateral axillary scars and a few erythematous nodules.   Neurological:      Mental Status: She is oriented to person, place, and time.          ASSESSMENT and PLAN    1. Hospital discharge follow-up  -     [place] DISCHARGE MEDS RECONCILED W/ CURRENT OUTPATIENT

## 2024-07-08 ENCOUNTER — OFFICE VISIT (OUTPATIENT)
Dept: FAMILY MEDICINE CLINIC | Facility: CLINIC | Age: 34
End: 2024-07-08
Payer: COMMERCIAL

## 2024-07-08 VITALS
HEIGHT: 64 IN | BODY MASS INDEX: 25 KG/M2 | SYSTOLIC BLOOD PRESSURE: 144 MMHG | DIASTOLIC BLOOD PRESSURE: 70 MMHG | TEMPERATURE: 98.8 F | OXYGEN SATURATION: 100 % | WEIGHT: 146.4 LBS | RESPIRATION RATE: 18 BRPM | HEART RATE: 110 BPM

## 2024-07-08 DIAGNOSIS — Z23 NEED FOR VACCINATION: ICD-10-CM

## 2024-07-08 DIAGNOSIS — I10 ESSENTIAL (PRIMARY) HYPERTENSION: Primary | ICD-10-CM

## 2024-07-08 PROCEDURE — 3077F SYST BP >= 140 MM HG: CPT | Performed by: INTERNAL MEDICINE

## 2024-07-08 PROCEDURE — 99213 OFFICE O/P EST LOW 20 MIN: CPT | Performed by: INTERNAL MEDICINE

## 2024-07-08 PROCEDURE — 90472 IMMUNIZATION ADMIN EACH ADD: CPT | Performed by: INTERNAL MEDICINE

## 2024-07-08 PROCEDURE — 3078F DIAST BP <80 MM HG: CPT | Performed by: INTERNAL MEDICINE

## 2024-07-08 PROCEDURE — 90471 IMMUNIZATION ADMIN: CPT | Performed by: INTERNAL MEDICINE

## 2024-07-08 PROCEDURE — 90739 HEPB VACC 2/4 DOSE ADULT IM: CPT | Performed by: INTERNAL MEDICINE

## 2024-07-08 PROCEDURE — 90715 TDAP VACCINE 7 YRS/> IM: CPT | Performed by: INTERNAL MEDICINE

## 2024-07-08 PROCEDURE — 90677 PCV20 VACCINE IM: CPT | Performed by: INTERNAL MEDICINE

## 2024-07-08 RX ORDER — AMLODIPINE BESYLATE 10 MG/1
10 TABLET ORAL DAILY
Qty: 90 TABLET | Refills: 3 | Status: SHIPPED | OUTPATIENT
Start: 2024-07-08

## 2024-07-08 ASSESSMENT — ENCOUNTER SYMPTOMS: SHORTNESS OF BREATH: 0

## 2024-07-08 ASSESSMENT — PATIENT HEALTH QUESTIONNAIRE - PHQ9
SUM OF ALL RESPONSES TO PHQ QUESTIONS 1-9: 0
SUM OF ALL RESPONSES TO PHQ9 QUESTIONS 1 & 2: 0
1. LITTLE INTEREST OR PLEASURE IN DOING THINGS: NOT AT ALL
SUM OF ALL RESPONSES TO PHQ QUESTIONS 1-9: 0
2. FEELING DOWN, DEPRESSED OR HOPELESS: NOT AT ALL

## 2024-07-08 NOTE — PROGRESS NOTES
HISTORY OF PRESENT ILLNESS  Angely Luna is a 34 y.o. female    HPI  Hypertension, improving, her blood pressure is better, we just added hydralazine to her amlodipine 2 weeks ago.  Patient wanted her vaccinations updated since she might be put on the list for kidney transplant, she is due for hepatitis B/pneumococcal vaccine and Tdap.  Patient is currently on hemodialysis 3 times a week through central venous access at the right subclavian vein, she will see surgery later for AV shunt.    Review of Systems   Constitutional:  Negative for chills and fever.   Respiratory:  Negative for shortness of breath.    Cardiovascular:  Negative for chest pain.         Physical Exam  Vitals reviewed.   Cardiovascular:      Rate and Rhythm: Regular rhythm. Tachycardia present.          ASSESSMENT and PLAN    1. Essential (primary) hypertension, improving, continue amlodipine and hydralazine at current doses  -     amLODIPine (NORVASC) 10 MG tablet; Take 1 tablet by mouth daily, Disp-90 tablet, R-3Normal  2. Need for vaccination  -     Hep B, HEPLISAV-B, (age 18 yrs+), IM, 0.5mL, 2-Dose  -     Pneumococcal, PCV20, PREVNAR 20, (age 6w+), IM, PF  -     Tdap, BOOSTRIX, (age 10 yrs+), IM         No follow-ups on file.

## 2024-07-08 NOTE — PROGRESS NOTES
Angely Luna is a 34 y.o. female (: 1990) presenting to address:    Chief Complaint   Patient presents with    Medication Check       Vitals:    24 0955   BP: (!) 154/82   Pulse: (!) 110   Resp: 18   Temp: 98.8 °F (37.1 °C)   SpO2: 100%       \"Have you been to the ER, urgent care clinic since your last visit?  Hospitalized since your last visit?\"    NO    “Have you seen or consulted any other health care providers outside of [facility] since your last visit?”    NO        “Have you had a pap smear?”    NO    No cervical cancer screening on file

## 2024-08-06 ENCOUNTER — OFFICE VISIT (OUTPATIENT)
Dept: FAMILY MEDICINE CLINIC | Facility: CLINIC | Age: 34
End: 2024-08-06
Payer: COMMERCIAL

## 2024-08-06 VITALS
BODY MASS INDEX: 25.13 KG/M2 | DIASTOLIC BLOOD PRESSURE: 70 MMHG | SYSTOLIC BLOOD PRESSURE: 130 MMHG | RESPIRATION RATE: 18 BRPM | WEIGHT: 147.2 LBS | TEMPERATURE: 98 F | OXYGEN SATURATION: 98 % | HEIGHT: 64 IN | HEART RATE: 85 BPM

## 2024-08-06 DIAGNOSIS — Z23 NEED FOR VACCINATION: ICD-10-CM

## 2024-08-06 DIAGNOSIS — L73.2 HIDRADENITIS SUPPURATIVA: ICD-10-CM

## 2024-08-06 DIAGNOSIS — I10 ESSENTIAL (PRIMARY) HYPERTENSION: Primary | ICD-10-CM

## 2024-08-06 PROCEDURE — 99213 OFFICE O/P EST LOW 20 MIN: CPT | Performed by: INTERNAL MEDICINE

## 2024-08-06 PROCEDURE — 90471 IMMUNIZATION ADMIN: CPT | Performed by: INTERNAL MEDICINE

## 2024-08-06 PROCEDURE — 3078F DIAST BP <80 MM HG: CPT | Performed by: INTERNAL MEDICINE

## 2024-08-06 PROCEDURE — 3075F SYST BP GE 130 - 139MM HG: CPT | Performed by: INTERNAL MEDICINE

## 2024-08-06 PROCEDURE — 90739 HEPB VACC 2/4 DOSE ADULT IM: CPT | Performed by: INTERNAL MEDICINE

## 2024-08-06 RX ORDER — DOXYCYCLINE HYCLATE 100 MG/1
100 CAPSULE ORAL 2 TIMES DAILY
Qty: 20 CAPSULE | Refills: 0 | Status: SHIPPED | OUTPATIENT
Start: 2024-08-06 | End: 2024-08-16

## 2024-08-06 ASSESSMENT — ENCOUNTER SYMPTOMS: SHORTNESS OF BREATH: 0

## 2024-08-06 NOTE — PROGRESS NOTES
HISTORY OF PRESENT ILLNESS  Angely Luna is a 34 y.o. female    HPI  Hypertension, stable, she is on hydralazine and Norvasc, she goes to dialysis 3 times a week, she stated that her blood pressure is dropping to 100/50 during dialysis, so she was told by nephrology not to take her hydralazine/Norvasc on dialysis days before her dialysis, she does have dialysis coming up today so she did not take Norvasc or hydralazine today, otherwise she is taking her medication on off dialysis days and her blood pressure is about 130/70.  Hidradenitis suppurativa, she has follow-up appointment with dermatology in 2 months, she noticed recently purulent drainage and pain in the left axillary area.  She had left arm AV fistula created 2 weeks ago, followed by vascular.    Review of Systems   Constitutional:  Negative for fever.   Respiratory:  Negative for shortness of breath.    Cardiovascular:  Negative for chest pain.   Neurological:  Negative for headaches.         Physical Exam  Vitals reviewed.   Cardiovascular:      Rate and Rhythm: Normal rate and regular rhythm.      Heart sounds: No murmur heard.  Pulmonary:      Effort: Pulmonary effort is normal.      Breath sounds: Normal breath sounds.   Skin:     Findings: Erythema (left axillary area has erythematous scar, tender, and has some purulent discharge) present.          ASSESSMENT and PLAN    1. Essential (primary) hypertension stable, continue hydralazine and Norvasc at current doses, I advised patient to take her Norvasc in the evening instead of the morning so she does not have to skip it on the morning of dialysis days, she would continue to skip her hydralazine on dialysis days until after dialysis and she does have her blood pressure monitored at dialysis 3 times a week.  2. Hidradenitis suppurativa  -     doxycycline hyclate (VIBRAMYCIN) 100 MG capsule; Take 1 capsule by mouth 2 times daily for 10 days, Disp-20 capsule, R-0Normal  -Advised patient to follow-up

## 2024-08-06 NOTE — PROGRESS NOTES
Angely Luna is a 34 y.o. female (: 1990) presenting to address:    Chief Complaint   Patient presents with    Medication Check       Vitals:    24 1000   BP: (!) 186/99   Pulse: 85   Resp: 18   Temp: 98 °F (36.7 °C)   SpO2: 98%       \"Have you been to the ER, urgent care clinic since your last visit?  Hospitalized since your last visit?\"    NO    “Have you seen or consulted any other health care providers outside of [facility] since your last visit?”    NO        “Have you had a pap smear?”    NO    No cervical cancer screening on file

## 2024-09-06 LAB — HBA1C MFR BLD HPLC: 8 %

## 2025-05-28 DIAGNOSIS — I10 ESSENTIAL (PRIMARY) HYPERTENSION: ICD-10-CM

## 2025-05-28 RX ORDER — AMLODIPINE BESYLATE 10 MG/1
10 TABLET ORAL DAILY
Qty: 30 TABLET | Refills: 0 | Status: SHIPPED | OUTPATIENT
Start: 2025-05-28

## 2025-05-28 NOTE — TELEPHONE ENCOUNTER
Medication refill request   Alert and oriented to person, place, time/situation. normal mood and affect. no apparent risk to self or others.

## 2025-05-30 NOTE — TELEPHONE ENCOUNTER
Called pt and schedule follow up appt.     Future Appointments   Date Time Provider Department Center   6/25/2025 11:15 AM Adriel Solis MD [facility]